# Patient Record
Sex: FEMALE
[De-identification: names, ages, dates, MRNs, and addresses within clinical notes are randomized per-mention and may not be internally consistent; named-entity substitution may affect disease eponyms.]

---

## 2023-05-17 ENCOUNTER — HOSPITAL ENCOUNTER (EMERGENCY)
Dept: HOSPITAL 97 - ER | Age: 28
LOS: 1 days | Discharge: HOME | End: 2023-05-18
Payer: COMMERCIAL

## 2023-05-17 DIAGNOSIS — R06.02: ICD-10-CM

## 2023-05-17 DIAGNOSIS — R07.9: Primary | ICD-10-CM

## 2023-05-17 PROCEDURE — 85379 FIBRIN DEGRADATION QUANT: CPT

## 2023-05-17 PROCEDURE — 99285 EMERGENCY DEPT VISIT HI MDM: CPT

## 2023-05-17 PROCEDURE — 81025 URINE PREGNANCY TEST: CPT

## 2023-05-17 PROCEDURE — 83735 ASSAY OF MAGNESIUM: CPT

## 2023-05-17 PROCEDURE — 93005 ELECTROCARDIOGRAM TRACING: CPT

## 2023-05-17 PROCEDURE — 85610 PROTHROMBIN TIME: CPT

## 2023-05-17 PROCEDURE — 96374 THER/PROPH/DIAG INJ IV PUSH: CPT

## 2023-05-17 PROCEDURE — 93970 EXTREMITY STUDY: CPT

## 2023-05-17 PROCEDURE — 81001 URINALYSIS AUTO W/SCOPE: CPT

## 2023-05-17 PROCEDURE — 80076 HEPATIC FUNCTION PANEL: CPT

## 2023-05-17 PROCEDURE — 71275 CT ANGIOGRAPHY CHEST: CPT

## 2023-05-17 PROCEDURE — 85025 COMPLETE CBC W/AUTO DIFF WBC: CPT

## 2023-05-17 PROCEDURE — 83880 ASSAY OF NATRIURETIC PEPTIDE: CPT

## 2023-05-17 PROCEDURE — 96375 TX/PRO/DX INJ NEW DRUG ADDON: CPT

## 2023-05-17 PROCEDURE — 80048 BASIC METABOLIC PNL TOTAL CA: CPT

## 2023-05-17 PROCEDURE — 84484 ASSAY OF TROPONIN QUANT: CPT

## 2023-05-17 PROCEDURE — 36415 COLL VENOUS BLD VENIPUNCTURE: CPT

## 2023-05-17 PROCEDURE — 71045 X-RAY EXAM CHEST 1 VIEW: CPT

## 2023-05-17 NOTE — XMS REPORT
Continuity of Care Document

                             Created on:May 17, 2023



Patient:LAURA JUAREZ

Sex:Female

:1995

External Reference #:[id number]





Demographics







                          Address                   [address]



                                                    [place] [address]

 

                          Home Phone                [phone]

 

                          Work Phone                [phone]

 

                          Mobile Phone              [phone]

 

                          Email Address             [email or web address]

 

                          Preferred Language        English

 

                          Marital Status            Unknown

 

                          Buddhism Affiliation     Unknown

 

                          Race                      Unknown

 

                          Additional Race(s)        Unavailable



                                                    Unavailable



                                                    White

 

                          Ethnic Group              Unknown









Author







                          Organization              [facility]

t

 

                          Address                   [address] [place] [address] [address] [place] [address]

 

                          Phone                     [phone]









Support







                Name            Relationship    Address         Phone

 

                Reggie Zaldivar       Spouse          [address] [phone]



                                                [place] [address] 









Care Team Providers







                    Name                Role                Phone

 

                    No, Pcp [facility]     Primary Care Physician Unavailable

 

                    EMILY VALLADARES     Attending Clinician Unavailable

 

                    Walter Yen DO Attending Clinician [phone]

 

                    YIN PINEDA       Attending Clinician Unavailable

 

                    Yin Pineda MD    Attending Clinician [phone]

 

                    Doctor Unassigned, No Name Attending Clinician Unavailable

 

                    Emily Valladares PA-C Attending Clinician [phone]

 

                    Edson Galdamez MD Attending Clinician [phone]

 

                    1, [facility]          Attending Clinician Unavailable

 

                    Lab, [facility]  Attending Clinician Unavailable

 

                    Hannah Taylor Attending Clinician [phone]

 

                    HANNAH GRIFFITHS    Attending Clinician Unavailable

 

                    VIRGINIA REYNOLDS    Attending Clinician Unavailable

 

                    EDSON GALDAMEZ Attending Clinician Unavailable

 

                    EDSON GALDAMEZ Attending Clinician Unavailable

 

                    JESS HAY     Attending Clinician Unavailable

 

                    EDSON GALDAMEZ Admitting Clinician Unavailable

 

                    Yin Pineda MD    Admitting Clinician [phone]

 

                    Edson Galdamez MD Admitting Clinician [phone]

 

                    YIN PINEDA       Admitting Clinician Unavailable

 

                    CASS HOYT Admitting Clinician Unavailable









Payers







           Payer Name Policy Type Policy Number Effective Date Expiration Date [employer]            [id number]  2020            



           HEALTH                           00:00:00              







Problems







       Condition Condition Condition Status Onset  Resolution Last   Treating Co

mments 

Source



       Name   Details Category        Date   Date   Treatment Clinician        



                                                 Date                 

 

       Liveborn Liveborn Disease Active                              Unive

rs



       infant, of infant, of               8-15                               it

y of



       jacobo jacobo               00:00:                             [place]

[place]



       pregnancy, pregnancy,               00                                 Me

dical



       born in born in                                                  [facility] hospital                                                  



       by vaginal by vaginal                                                  



       delivery delivery                                                  

 

       Encounter Encounter Disease Active                              Uni

vers



       for    for                  8-14                               ity of



       elective elective               00:00:                             [place]



       induction induction               00                                 Medi

neal



       of labor of labor                                                  [place]

 

       Elevated Elevated Disease Active                              Unive

rs



       blood  blood                8-12                               ity of



       pressure pressure               00:00:                             [place]



       reading reading               00                                 Medical



       without without                                                  [facility]



       diagnosis diagnosis                                                  



       of     of                                                      



       hypertensi hypertensi                                                  



       on     on                                                      

 

       Gestationa Gestationa Disease Active                              U

nivers



       l      l                    8-12                               ity of



       hypertensi hypertensi               00:00:                             [place]

[place]



       on, third on, third               00                                 Medi

neal



       trimester trimester                                                  [facility]

ch

 

       Obesity Obesity Disease Active                              [facility]



       (BMI   (BMI                 6-24                               ity of



       30-39.9) 30-39.9)               00:00:                             [place]



                                   00                                 [facility]

 

       Supervisio Supervisio Disease Active                              U

nivers



       n of other n of other               6-24                               it

y of



       normal normal               00:00:                             [place]



       pregnancy pregnancy               00                                 [facility]

 

       Pregnancy Pregnancy Disease Active                              [facility]



                                   2-16                               [facility]



                                   00:00:                             [address]

 

       Liveborn Liveborn Disease Active                       Overview: Un

sourav



       infant by infant by               3-18                        IOL;   ity 

of



       vaginal vaginal               00:00:                      39w4d; [place]



       delivery delivery               00                          ;  [facility]

l



                                                               intact [place]

 

       Anemia, Anemia, Disease Active                              [facility]



       postpartum postpartum               3-18                               it

y of



                                   00:00:                             [address]

 

       39 weeks 39 weeks Disease Active                              Unive

rs



       gestation gestation               3-17                               ity 

of



       of     of                   00:00:                             [place]



       pregnancy pregnancy               00                                 [facility]

 

       Multiparit Multiparit Disease Active                              U

nivers



       y      y                    8-                               ity of



                                   00:00:                             [address]

 

       ASCUS with ASCUS with Disease Active                       Overview

: [facility]



       positive positive                                       Repeat 1 ity 

of



       high risk high risk               00:00:                      year   [place]

s



       HPV    HPV                                                   [facility]

 

       Obesity Obesity Disease Active                       Overview: Univ

ers



       complicati complicati                                       ICD10  it

y of



       ng     ng                   00:00:                      Diagnosis [place]



       pregnancy, pregnancy,               00                          Term   Me

dical



       childbirth childbirth                                           [facility] 

Br[facility]



       , or   , or                                             Utility 



       puerperium puerperium                                                  



       ,      ,                                                       



       antepartum antepartum                                                  

 

       Supervisio Supervisio Disease Active                       Overview

: Univers



       n of other n of other               8-27                        ICD10  it

y of



       high-risk high-risk               00:00:                      Diagnosis T

[place]



       pregnancy pregnancy               00                          Term   Medi

neal



                                                               [facility]



                                                               Utility 

 

       Screening Screening Disease Active                              Uni

vers



       for STD for STD               2-18                               ity of



       (sexually (sexually               00:00:                             [place]

s



       transmitte transmitte               00                                 Me

dical



       d disease) d disease)                                                  Br

anch







Allergies, Adverse Reactions, Alerts







       Allergy Allergy Status Severity Reaction(s) Onset  Inactive Treating Comm

ents 

Source



       Name   Type                        Date   Date   Clinician        

 

       NO KNOWN Drug   Active                                           [facility]



       ALLERGIE Class                                                   ity of



       S                                                              [facility]

 

       NO KNOWN Allergy Active                                           [facility]







Social History







           Social Habit Start Date Stop Date  Quantity   Comments   Source

 

           ASSERTION  2019            Pregnant              [facility]



                      00:00:00                                    [facility]

 

           Exposure to                       Not sure              [facility]



           SARS-CoV-2                                             [place] [facility]



           (event)                                                Branch

 

           Alcohol intake 2021 Current               [facility]

es



                      00:00:00   00:00:00   non-drinker of            Medical Ce

nter



                                            alcohol               



                                            (finding)             

 

           Tobacco use and 2020-10-01 2020-10-01 Never used            [facility]

y of



           exposure   00:00:00   00:00:00                         [place] [facility]



                                                                  Branch

 

           History [facility] Food 2020 1                     Univers

ity of



           Worry      00:00:00   00:00:00                         [place] [facility]



                                                                  Branch

 

           History [facility] Food 2020 1                     Univers

ity of



           Scarcity   00:00:00   00:00:00                         [place] [facility]



                                                                  Branch

 

           History SDOH 2020 2                     University o

f



           Transport Med 00:00:00   00:00:00                         [place] [facility]

al



                                                                  Branch

 

           History [facility] 2020 2                     University o

f



           Transport Non-Med 00:00:00   00:00:00                         [place] M

edical



                                                                  Branch

 

           Sex Assigned At 1995                       [facility]

[facility]



           [facility]      00[facility]00[facility]00   [facility]00                         [facility]









                Smoking Status  Start Date      Stop Date       Source

 

                Never smoker                                    [facility]







Medications







       Ordered Filled Start  Stop   Current Ordering Indication Dosage Frequency

 Signature

                    Comments            Components          Source



     Medication Medication Date Date Medication? Clinician                (SIG) 

          



     Name Name                                                   

 

     levonorgest      2020 2020- No             1{devic                     Un

sourav



     reL       0- 10-                e}                       ity of



     (MIRENA)      18:00: 16:47                                         [place]



     IUD 1      00   :00                                          Medical



     Device                                                        Branch

 

     levonorgest      2020 2020- No             1{devic      1 Device,        

   Univers



     reL       0- 10-                e}        Intrauteri           ity of



     (MIRENA)      18:00: 16:47                          ne, ONCE,           [facility]

as



     IUD 1      00   :00                           1 dose,           Medical



     Device                                         u            Branch



                                                  10/1/20 at           



                                                  1300,           



                                                  Routine           

 

     levonorgest      2020 2020- No             1{devic                     Un

sourav



     reL       0- 10-                e}                       ity of



     (MIRENA)      18:00: 16:47                                         [place]



     IUD 1      00   :00                                          Medical



     Device                                                        Branch

 

     levonorgest      2020 2020- No             1{devic      1 Device,        

   Univers



     reL       0- 10-                e}        Intrauteri           ity of



     (MIRENA)      18:00: 16:47                          ne, ONCE,           [facility]

as



     IUD 1      00   :00                           1 dose,           Medical



     Device                                         u            Branch



                                                  10/1/20 at           



                                                  1300,           



                                                  Routine           

 

     prenatal      -0      Yes       [id number] 1{tbl}      Take 1           U

nivers



     vitamin      8-20                               tablet by           ity of



     w/FA tablet      00:00:                               mouth           [place]



               00                                 daily.           Medical



                                                                 Branch

 

     prenatal      -0      Yes       [id number] 1{tbl}      Take 1           U

nivers



     vitamin      8-20                               tablet by           ity of



     w/FA tablet      00:00:                               mouth           [place]



               00                                 daily.           Medical



                                                                 Branch

 

     prenatal      2020-0      Yes       [id number] 1{tbl}      Take 1           U

nivers



     vitamin      8-20                               tablet by           ity of



     w/FA tablet      00:00:                               mouth           [place]



               00                                 daily.           Medical



                                                                 Branch

 

     prenatal      2020-0      Yes       [id number] 1{tbl}      Take 1           U

nivers



     vitamin      8-20                               tablet by           ity of



     w/FA tablet      00:00:                               mouth           [place]



               00                                 daily.           Medical



                                                                 Branch

 

     prenatal      2020-0      Yes       [id number] 1{tbl}      Take 1           U

nivers



     vitamin      8-20                               tablet by           ity of



     w/FA tablet      00:00:                               mouth           [place]



               00                                 daily.           Medical



                                                                 [place]

 

     prenatal      2020-0      Yes       [id number] 1{tbl}      Take 1           U

nivers



     vitamin      8-20                               tablet by           ity of



     w/FA tablet      00:00:                               mouth           [place]



               00                                 daily.           Medical



                                                                 Branch

 

     prenatal      2020-0      Yes       [id number] 1{tbl}      Take 1           U

nivers



     vitamin      8-20                               tablet by           ity of



     w/FA tablet      00:00:                               mouth           [place]



               00                                 daily.           Medical



                                                                 Branch

 

     prenatal      2020-0      Yes       [id number] 1{tbl}      Take 1           U

nivers



     vitamin      8-15                               tablet by           ity of



     w/FA tablet      00:00:                               mouth           [place]



               00                                 daily.           Medical



                                                                 Branch

 

     docusate      2020-0      Yes       [id number] 240mg      Take 1           Un

sourav



     calcium 240      8-15                               capsule by           it

y of



     mg capsule      00:00:                               mouth once           T

[place]



               00                                 daily as           Medical



                                                  needed for           Branch



                                                  Constipati           



                                                  on.            

 

     ferrous      2020-0      Yes       [id number] 325mg      Take 1           Uni

vers



     sulfate 325      8-15                               tablet by           ity

 of



     mg (65 mg      00:00:                               mouth 2           [place]



     iron)      00                                 (two)           Medical



     tablet                                         times           Branch



                                                  daily.           

 

     ibuprofen      -0      Yes       [id number] 600mg      Take 1           U

nivers



     600 mg      8-15                               tablet by           ity of



     tablet      00:00:                               mouth           [place]



               00                                 every 6           Medical



                                                  (six)           Branch



                                                  hours as           



                                                  needed           



                                                  (Pain).           



                                                  Take with           



                                                  food or           



                                                  milk.           

 

     docusate      -0      Yes       [id number] 240mg      Take 1           Un

sourav



     calcium 240      8-15                               capsule by           it

y of



     mg capsule      00:00:                               mouth once           T

exas



               00                                 daily as           Medical



                                                  needed for           Branch



                                                  Constipati           



                                                  on.            

 

     ferrous      -0      Yes       [id number] 325mg      Take 1           Uni

vers



     sulfate 325      8-15                               tablet by           ity

 of



     mg (65 mg      00:00:                               mouth 2           [place]



     iron)      00                                 (two)           Medical



     tablet                                         times           Branch



                                                  daily.           

 

     ibuprofen      -0      Yes       [id number] 600mg      Take 1           U

nivers



     600 mg      8-15                               tablet by           ity of



     tablet      00:00:                               mouth           [place]



               00                                 every 6           Medical



                                                  (six)           Branch



                                                  hours as           



                                                  needed           



                                                  (Pain).           



                                                  Take with           



                                                  food or           



                                                  milk.           

 

     docusate      -0 2020- No        [id number] 240mg      Take 1           U

nivers



     calcium 240      8-15 09-15                          capsule by           i

ty of



     mg capsule      00:00: 00:00                          mouth once           

[place]



               00   :00                           daily as           Medical



                                                  needed for           Branch



                                                  Constipati           



                                                  on.            

 

     ferrous      -0 2020- No        [id number] 325mg      Take 1           Un

sourav



     sulfate 325      8-15 09-15                          tablet by           it

y of



     mg (65 mg      00:00: 00:00                          mouth 2           [place]

s



     iron)      00   :00                           (two)           Medical



     tablet                                         times           Branch



                                                  daily.           

 

     ibuprofen      -0 2020- No        [id number] 600mg      Take 1           

Univers



     600 mg      8-15 09-15                          tablet by           ity of



     tablet      00:00: 00:00                          mouth           [place]



               00   :00                           every 6           [facility]



                                                  (six)           [facility]



                                                  hours as           



                                                  needed           



                                                  (Pain).           



                                                  Take with           



                                                  food or           



                                                  milk.           

 

     prenatal      -0 2020- No        [id number] 1{tbl}      Take 1           

[facility]



     vitamin      815 08-20                          tablet by           [facility]



     w/FA tablet      00:00: 00:00                          mouth           [place]

[facility]



               00   :00                           daily.           [facility]



                                                                 [facility]

 

     rho(D)      -0      Yes            300ug      300 mcg,           [facility]

s



     immune      8-14                               Intramuscu           ity of



     globulin      21:54:                               lar, ONCE,           [facility]

as



     (RHOGAM)      06                                 For 1           Medical



     syringe 300                                         dose,           [facility]



     mcg                                          Conditiona           



                                                  l, Routine           

 

     HYDROcodone      -0      Yes            1{tbl}      1 tablet,          

 [facility]



     -acetaminop      814                               Oral,           [facility] of



     hen (NORCO      21:53:                               Q6HPRN,           [place]

[facility]



     5) 5-325 mg      49                                 Starting           Medi

neal



     tablet 1                                         Fri            [facility]anch



     tablet                                         20 at           



                                                  1653,           



                                                  Until           



                                                  Discontinu           



                                                  ed,            



                                                  Routine,           



                                                  Pain           



                                                  (scale           



                                                  7-10)           

 

     ibuprofen      -0      Yes            600mg      600 mg,           [facility]

ers



     (IBU)      8-14                               Oral,           [facility]



     tablet 600      21:53:                               Q6HPRN,           [place]

[facility]



     mg        49                                 Starting           Medical



                                                  Fri            [facility]



                                                  20 at           



                                                  1653,           



                                                  Until           



                                                  Discontinu           



                                                  ed,            



                                                  Routine,           



                                                  Pain           



                                                  (scale           



                                                  4-6)           

 

     acetaminoph      -0      Yes            650mg      650 mg,           Un

[facility]



     en        8-14                               Oral,           ity of



     (TYLENOL)      21:53:                               Q6HPRN,           [place]



     tablet 650      49                                 Starting           Medic

al



     mg                                           Fri            [facility]



                                                  20 at           



                                                  1653,           



                                                  Until           



                                                  Discontinu           



                                                  ed,            



                                                  Routine,           



                                                  Pain           



                                                  (scale           



                                                  1-3)           

 

     diphenhydrA      2020-0      Yes            25mg      25 mg,           Un[facility]

ers



     MINE      8-14                               Oral,           [facility] of



     (BENADRYL)      21:53:                               Q6HPRN,           [place]

[facility]



     tablet 25      49                                 Starting           [facility]

l



     mg                                           Fri            [facility]



                                                  20 at           



                                                  1653,           



                                                  Until           



                                                  Discontinu           



                                                  ed,            



                                                  Routine,           



                                                  Sleep,           



                                                  Itching           

 

     ondansetron      2020-0      Yes            4mg       4 mg, Slow           

[facility]



     (ZOFRAN                                     IV Push,           [facility]



     (PF))      21:53:                               Q8HPRN,           [place]



     injection 4      49                                 Starting           Medi

neal



     mg                                           Fri            [facility]



                                                  20 at           



                                                  1653,           



                                                  Until           



                                                  Discontinu           



                                                  ed,            



                                                  Routine,           



                                                  Nausea and           



                                                  Vomiting           



                                                  (N/V)           

 

     simethicone      2020-0      Yes            160mg      160 mg,           Un

s[facility]



     (GAS RELIEF                                     Oral,           ity of



     (SIMETHICON      21:53:                               PC+HSPRN,           [facility]

[facility]



     E))       49                                 Starting           Medical



     chewable                                         Fri            [facility]



     tablet 160                                         20 at           



     mg                                           1653,           



                                                  Until           



                                                  Discontinu           



                                                  ed,            



                                                  Routine,           



                                                  Gas            

 

     docusate      2020-0      Yes            240mg      240 mg,           [facility]

rs



     calcium      8-                               Oral,           ity of



     (SURFAK)      21:53:                               QDAILYPRN,           [facility]

as



     capsule 240      49                                 Starting           Medi

neal



     mg                                           Fri            [facility]



                                                  20 at           



                                                  1653,           



                                                  Until           



                                                  Discontinu           



                                                  ed,            



                                                  Routine,           



                                                  Constipati           



                                                  on             

 

     magnesium      2020-0      Yes            30mL      30 mL,           [facility]

s



     hydroxide                                     Oral,           ity of



     (MILK OF      21:53:                               QDAILYPRN,           [facility]

as



     MAGNESIA)      49                                 Starting           [facility]

l



     400 mg/5 mL                                         Fri            [facility]



     suspension                                         20 at           



     30 mL                                         3,           



                                                  Until           



                                                  Discontinu           



                                                  ed,            



                                                  Routine,           



                                                  Constipati           



                                                  on             

 

     benzocaine-      2020-0      Yes                      Topical,           Un

[facility]



     menthol                                     PRN,           [facility]



     (DERMOPLAST      21:53:                               Starting           [facility]

[place]



     ) 20-0.5 %      49                                 Fri            Medical



     topical                                         20 at           [facility]



     spray                                         1653,           



                                                  Until           



                                                  Discontinu           



                                                  ed,            



                                                  Routine,           



                                                  Perineum           



                                                  discomfort           

 

     LR 1000 mL      0 2020- No                       at 125           [facility]

ers



     + oxytocin      14                          mL/hr, IV           ity

 of



     20 units IV      16:15: 17:39                          Infusion,           

[place]



     Solution      00   :00                           ONCE, 1           Medical



                                                  dose, Fri           [facility]



                                                  20 at           



                                                  1115,           



                                                  Routine           

 

     FENTanyl PF      -0 2020- No             100ug      100 mcg,           

[facility]



     (SUBLIMAZE                                Slow IV           ity o

f



     (PF))      12:31: 21:54                          Push,           [place]



     injection      01   :07                           Q1HPRN,           Medical



     100 mcg                                         Starting           [facility]



                                                  Fri            



                                                  20 at           



                                                  0731,           



                                                  Until Fri           



                                                  20 at           



                                                  1654,           



                                                  Routine,           



                                                  contractio           



                                                  n pain           



                                                  without an           



                                                  epidural           



                                                  and SVE <           



                                                  8 cm and           



                                                  Cat I           



                                                  strip           

 

     LR 1000 mL      2020-0 2020- No             2mU/min      at 6-120          

 [facility]



     + oxytocin       08-14                          mL/hr, IV           ity

 of



     20 units IV      10:28: 21:54                          Infusion,           

[place]



     Solution      38   :07                           TITRATE,           Medical



                                                  Starting           [facility]



                                                  20 at           



                                                  0528,           



                                                  Until 20 at           



                                                  1654, ASAP           

 

     D5W-LR IV      2020-0 2020- No             1000mL      at 125           Uni

vers



     infusion       08-14                          mL/hr, IV           ity o

f



     1,000 mL      09:30: 21:54                          Infusion,           [facility]

as



               00   :07                           CONTINUOUS           Medical



                                                  , Starting           [facility]



                                                  20 at           



                                                  0430,           



                                                  Until 20 at           



                                                  1654,           



                                                  Routine           

 

     famotidine      2020-0      Yes       [id number] 20mg      Take 1           

[facility]



     20 mg      7-13                               tablet by           [facility]



     tablet      00:00:                               mouth 2           [place]



               00                                 (two)           Medical



                                                  times           [facility]



                                                  daily.           

 

     hydrOXYzine      2020-0      Yes       [id number] 25mg      Take 1           

[facility]



     25 mg      7-13                               tablet by           [facility]



     tablet      00:00:                               mouth           [place]



               00                                 every 6           Medical



                                                  (six)           [facility]



                                                  hours as           



                                                  needed for           



                                                  Itching.           

 

     famotidine      2020-0      Yes       [id number] 20mg      Take 1           

[facility]



     20 mg      7-13                               tablet by           [facility] of



     tablet      00:00:                               mouth 2           [place]



               00                                 (two)           Medical



                                                  times           [facility]



                                                  daily.           

 

     hydrOXYzine      2020-0      Yes       [id number] 25mg      Take 1           

[facility]



     25 mg      7-13                               tablet by           [facility] of



     tablet      00:00:                               mouth           [place]



               00                                 every 6           Medical



                                                  (six)           [facility]



                                                  hours as           



                                                  needed for           



                                                  Itching.           

 

     famotidine      2020-0      Yes       [id number] 20mg      Take 1           

[facility]



     20 mg      7-13                               tablet by           [facility] of



     tablet      00:00:                               mouth 2           [place]



                                                (two)           Medical



                                                  times           [facility]



                                                  daily.           

 

     hydrOXYzine      2020-0      Yes       [id number] 25mg      Take 1           

[facility]



     25 mg      7-13                               tablet by           [facility]



     tablet      00:00:                               mouth           [place]



               00                                 every 6           Medical



                                                  (six)           [facility]



                                                  hours as           



                                                  needed for           



                                                  Itching.           

 

     famotidine      2020-0      Yes       [id number] 20mg      Take 1           

[facility]



     20 mg      7-13                               tablet by           [facility] of



     tablet      00:00:                               mouth 2           [place]



               00                                 (two)           Medical



                                                  times           [facility]



                                                  daily.           

 

     hydrOXYzine      2020-0      Yes       [id number] 25mg      Take 1           

[facility]



     25 mg      7-13                               tablet by           ity of



     tablet      00:00:                               mouth           [place]



               00                                 every 6           [facility]



                                                  (six)           [facility]



                                                  hours as           



                                                  needed for           



                                                  Itching.           

 

     famotidine      2020-0      Yes       [id number] 20mg      Take 1           

[facility]



     20 mg      7-13                               tablet by           ity of



     tablet      00:00:                               mouth 2           [place]



               00                                 (two)           Medical



                                                  times           [facility]



                                                  daily.           

 

     hydrOXYzine      2020-0      Yes       [id number] 25mg      Take 1           

[facility]



     25 mg      7-13                               tablet by           ity of



     tablet      00:00:                               mouth           [place]



               00                                 every 6           Medical



                                                  (six)           [facility]



                                                  hours as           



                                                  needed for           



                                                  Itching.           

 

     famotidine      2020-0      Yes       [id number] 20mg      Take 1           

[facility]



     20 mg      7-13                               tablet by           ity of



     tablet      00:00:                               mouth 2           [place]



               00                                 (two)           Medical



                                                  times           [facility]



                                                  daily.           

 

     hydrOXYzine      2020-0      Yes       [id number] 25mg      Take 1           

[facility]



     25 mg      7-13                               tablet by           ity of



     tablet      00:00:                               mouth           [place]



               00                                 every 6           [facility]



                                                  (six)           [facility]



                                                  hours as           



                                                  needed for           



                                                  Itching.           

 

     famotidine      2020-0      Yes       [id number] 20mg      Take 1           

[facility]



     20 mg      7-13                               tablet by           ity of



     tablet      00:00:                               mouth 2           [place]



               00                                 (two)           Medical



                                                  times           [facility]



                                                  daily.           

 

     hydrOXYzine      2020-0      Yes       [id number] 25mg      Take 1           

[facility]



     25 mg      7-13                               tablet by           ity of



     tablet      00:00:                               mouth           [place]



               00                                 every 6           [facility]



                                                  (six)           [facility]



                                                  hours as           



                                                  needed for           



                                                  Itching.           

 

     famotidine      2020-0 2020- No        [id number] 20mg      Take 1          

 [facility]



     20 mg      7-13 08-15                          tablet by           ity of



     tablet      00:00: 00:00                          mouth 2           [place]



               00   :00                           (two)           Medical



                                                  times           [facility]



                                                  daily.           

 

     hydrOXYzine      2020-0 2020- No        [id number] 25mg      Take 1          

 [facility]



     25 mg      7-13 08-15                          tablet by           ity of



     tablet      00:00: 00:00                          mouth           [place]



               00   :00                           every 6           [facility]



                                                  (six)           [facility]



                                                  hours as           



                                                  needed for           



                                                  Itching.           

 

     PRENATAL      2017-0      Yes            1{tbl} QD   Take 1           [facility]

t



     VIT#96/FERR      2-18                               tablet by           [facility] FUM/FA      11:31:                               mouth           [facility]

l



     (PRENATAL      52                                 daily.           [facility]



     VITAMIN                                                        



     W/IRON-BRET                                                        



     TE) 27 mg                                                        



     iron- 800                                                        



     mcg Tab                                                        

 

     docusate      -0      Yes            240mg      Take 1 Cap           Un

[facility]rav



     calcium      3-19                               by mouth           [facility]



     (SURFAK)      00:00:                               once daily           [facility]

as



     240 mg      00                                 as needed           Medical



     capsule                                         for            [facility]



                                                  Constipati           



                                                  on.            

 

     ferrous      2016-0      Yes            325mg      Take 1 Tab           [facility]

vers



     sulfate 325      3-19                               by mouth 3           it

y of



     mg (65 mg      00:00:                               (three)           [place]



     iron)      00                                 times           [facility]



     tablet                                         daily with           [facility]



                                                  meals.           

 

     docusate      2016-0      Yes            240mg      Take 1 Cap           [facility]

[facility]



     calcium      3-19                               by mouth           [facility]



     (SURFAK)      00:00:                               once daily           [facility]

[facility]



     240 mg      00                                 as needed           [facility]



     capsule                                         for            [facility]



                                                  Constipati           



                                                  on.            

 

     ferrous      2016-0      Yes            325mg      Take 1 Tab           [facility]

[facility]



     sulfate 325      3-19                               by mouth 3           it

y of



     mg (65 mg      00:00:                               (three)           [place]



     iron)      00                                 times           [facility]



     tablet                                         daily with           [facility]



                                                  meals.           

 

     docusate      2016-0      Yes            240mg      Take 1 Cap           [facility]

[facility]



     calcium      3-19                               by mouth           [facility]



     (SURFAK)      00:00:                               once daily           [facility]

[facility]



     240 mg      00                                 as needed           [facility]



     capsule                                         for            [facility]



                                                  Constipati           



                                                  on.            

 

     ferrous      2016-0      Yes            325mg      Take 1 Tab           [facility]

[facility]



     sulfate 325      3-19                               by mouth 3           it

y of



     mg (65 mg      00:00:                               (three)           [place]



     iron)      00                                 times           Medical



     tablet                                         daily with           [facility]



                                                  meals.           

 

     docusate      2016-0      Yes            240mg      Take 1 Cap           Un

[facility]



     calcium      3-19                               by mouth           [facility]



     (SURFAK)      00:00:                               once daily           [facility]

as



     240 mg      00                                 as needed           [facility]



     capsule                                         for            [facility]



                                                  Constipati           



                                                  on.            

 

     ferrous      2016-0      Yes            325mg      Take 1 Tab           [facility]

[facility]



     sulfate 325      3-19                               by mouth 3           it

y of



     mg (65 mg      00:00:                               (three)           [place]



     iron)      00                                 times           Medical



     tablet                                         daily with           [facility]



                                                  meals.           

 

     docusate      2016-0      Yes            240mg      Take 1 Cap           Un

[facility]



     calcium      3-19                               by mouth           [facility]



     (SURFAK)      00:00:                               once daily           [facility]

as



     240 mg      00                                 as needed           [facility]



     capsule                                         for            [facility]



                                                  Constipati           



                                                  on.            

 

     docusate      2016-0      Yes            240mg      Take 1 Cap           Un

[facility]



     calcium      3-19                               by mouth           [facility] of



     (SURFAK)      00:00:                               once daily           [facility]

as



     240 mg      00                                 as needed           [facility]



     capsule                                         for            [facility]



                                                  Constipati           



                                                  on.            

 

     ferrous      2016-0      Yes            325mg      Take 1 Tab           [facility]

[facility]



     sulfate 325      3-19                               by mouth 3           it

y of



     mg (65 mg      00:00:                               (three)           [place]



     iron)      00                                 times           [facility]



     tablet                                         daily with           [facility]



                                                  meals.           

 

     ferrous      2016-0      Yes            325mg      Take 1 Tab           [facility]

vers



     sulfate 325      3-19                               by mouth 3           it

y of



     mg (65 mg      00:00:                               (three)           [place]



     iron)      00                                 times           [facility]



     tablet                                         daily with           [facility]



                                                  meals.           

 

     docusate      2016-0      Yes            240mg      Take 1 Cap           Un

[facility]



     calcium      3-19                               by mouth           [facility]



     (SURFAK)      00:00:                               once daily           [facility]

as



     240 mg      00                                 as needed           [facility]



     capsule                                         for            [facility]



                                                  Constipati           



                                                  on.            

 

     ibuprofen      2016-0      Yes            600mg      Take 1 Tab           U

nivers



     (MOTRIN)      3-19                               by mouth           ity of



     600 mg      00:00:                               every 6           [place]



     tablet      00                                 (six)           [facility]



                                                  hours as           [facility]



                                                  needed for           



                                                  Pain           



                                                  (scale           



                                                  4-6). Take           



                                                  with food           



                                                  or milk.           

 

     ferrous      2016-0      Yes            325mg      Take 1 Tab           [facility]

[facility]



     sulfate 325      3-19                               by mouth 3           it

y of



     mg (65 mg      00:00:                               (three)           [place]



     iron)      00                                 times           [facility]



     tablet                                         daily with           [facility]



                                                  meals.           

 

     docusate      2016-0      Yes            240mg      Take 1 Cap           Un

[facility]



     calcium      3-19                               by mouth           [facility] of



     (SURFAK)      00:00:                               once daily           [facility]

[facility]



     240 mg      00                                 as needed           [facility]



     capsule                                         for            [facility]



                                                  Constipati           



                                                  on.            

 

     ferrous      2016-0      Yes            325mg      Take 1 Tab           [facility]

vers



     sulfate 325      3-19                               by mouth 3           it

y of



     mg (65 mg      00:00:                               (three)           [place]



     iron)      00                                 times           Medical



     tablet                                         daily with           [facility]



                                                  meals.           

 

     docusate      2016-0      Yes            240mg      Take 1 Cap           [facility]

[facility]



     calcium      3-19                               by mouth           [facility]



     (SURFAK)      00:00:                               once daily           [facility]

[facility]



     240 mg      00                                 as needed           [facility]



     capsule                                         for            [facility]



                                                  Constipati           



                                                  on.            

 

     ferrous      2016-0      Yes            325mg      Take 1 Tab           [facility]

[facility]



     sulfate 325      3-19                               by mouth 3           it

y of



     mg (65 mg      00:00:                               (three)           [place]



     iron)      00                                 times           Medical



     tablet                                         daily with           [facility]



                                                  meals.           

 

     docusate      2016-0      Yes            240mg      Take 1 Cap           [facility]

[facility]



     calcium      3-19                               by mouth           [facility]



     (SURFAK)      00:00:                               once daily           [facility]

as



     240 mg      00                                 as needed           [facility]



     capsule                                         for            [facility]



                                                  Constipati           



                                                  on.            

 

     ferrous      2016-0      Yes            325mg      Take 1 Tab           [facility]

[facility]



     sulfate 325      3-19                               by mouth 3           it

y of



     mg (65 mg      00:00:                               (three)           [place]



     iron)      00                                 times           [facility]



     tablet                                         daily with           [facility]



                                                  meals.           

 

     docusate      2016-0      Yes            240mg      Take 1 Cap           [facility]

[facility]



     calcium      3-19                               by mouth           [facility]



     (SURFAK)      00:00:                               once daily           [facility]

as



     240 mg      00                                 as needed           [facility]



     capsule                                         for            [facility]



                                                  Constipati           



                                                  on.            

 

     ferrous      2016-0      Yes            325mg      Take 1 Tab           [facility]

[facility]



     sulfate 325      3-19                               by mouth 3           it

y of



     mg (65 mg      00:00:                               (three)           [place]



     iron)      00                                 times           [facility]



     tablet                                         daily with           [facility]



                                                  meals.           

 

     docusate      2016-0      Yes            240mg      Take 1 Cap           Un

[facility]



     calcium      3-19                               by mouth           [facility] of



     (SURFAK)      00:00:                               once daily           [facility]

as



     240 mg      00                                 as needed           [facility]



     capsule                                         for            [facility]



                                                  Constipati           



                                                  on.            

 

     ferrous      2016-0      Yes            325mg      Take 1 Tab           [facility]

vers



     sulfate 325      3-19                               by mouth 3           it

y of



     mg (65 mg      00:00:                               (three)           [place]



     iron)      00                                 times           [facility]



     tablet                                         daily with           [facility]



                                                  meals.           

 

     docusate      2016-0      Yes            240mg      Take 1 Cap           [facility]

[facility]



     calcium      3-19                               by mouth           [facility]



     (SURFAK)      00:00:                               once daily           [facility]

as



     240 mg      00                                 as needed           [facility]



     capsule                                         for            [facility]



                                                  Constipati           



                                                  on.            

 

     ferrous      2016-0      Yes            325mg      Take 1 Tab           [facility]

[facility]



     sulfate 325      3-19                               by mouth 3           it

y of



     mg (65 mg      00:00:                               (three)           [place]



     iron)      00                                 times           [facility]



     tablet                                         daily with           [facility]



                                                  meals.           

 

     docusate      2016-0      Yes            240mg      Take 1 Cap           [facility]

[facility]



     calcium      3-19                               by mouth           [facility]



     (SURFAK)      00:00:                               once daily           [facility]

as



     240 mg      00                                 as needed           [facility]



     capsule                                         for            [facility]



                                                  Constipati           



                                                  on.            

 

     ferrous      2016-0      Yes            325mg      Take 1 Tab           [facility]

[facility]



     sulfate 325      3-19                               by mouth 3           it

y of



     mg (65 mg      00:00:                               (three)           [place]



     iron)      00                                 times           [facility]



     tablet                                         daily with           [facility]



                                                  meals.           

 

     docusate      2016-0      Yes            240mg      Take 1 Cap           [facility]

[facility]



     calcium      3-19                               by mouth           [facility] of



     (SURFAK)      00:00:                               once daily           [facility]

as



     240 mg      00                                 as needed           [facility]



     capsule                                         for            [facility]



                                                  Constipati           



                                                  on.            

 

     ferrous      2016-0      Yes            325mg      Take 1 Tab           [facility]

vers



     sulfate 325      3-19                               by mouth 3           it

y of



     mg (65 mg      00:00:                               (three)           [place]



     iron)      00                                 times           [facility]



     tablet                                         daily with           [facility]



                                                  meals.           

 

     docusate      2016-0      Yes            240mg      Take 1 Cap           [facility]

[facility]



     calcium      3-19                               by mouth           [facility] of



     (SURFAK)      00:00:                               once daily           [facility]

as



     240 mg      00                                 as needed           [facility]



     capsule                                         for            [facility]



                                                  Constipati           



                                                  on.            

 

     ferrous            Yes            325mg      Take 1 Tab           [facility]

vers



     sulfate 325      3-19                               by mouth 3           it

y of



     mg (65 mg      00:00:                               (three)           [place]



     iron)      00                                 times           [facility]



     tablet                                         daily with           [facility]



                                                  meals.           

 

     docusate       2020- No             240mg      Take 1 Cap           [facility]

n[facility]



     calcium      3-19 08-15                          by mouth           [facility] of



     (SURFAK)      00:00: 00:00                          once daily           [facility]



     240 mg      00   :00                           as needed           [facility]



     capsule                                         for            [facility]



                                                  Constipati           



                                                  on.            

 

     ferrous      2020- No             325mg      Take 1 Tab           [facility]

[facility]



     sulfate 325      3-19 08-15                          by mouth 3           i

ty of



     mg (65 mg      00:00: 00:00                          (three)           [place]

[facility]



     iron)      00   :00                           times           [facility]



     tablet                                         daily with           [facility]



                                                  meals.           

 

     ibuprofen      2020- No             600mg      Take 1 Tab           

[facility]



     (MOTRIN)      3-19 06-24                          by mouth           [facility]



     600 mg      00:00: 00:00                          every 6           [place]



     tablet      00   :00                           (six)           [facility]



                                                  hours as           [facility]



                                                  needed for           



                                                  Pain           



                                                  (scale           



                                                  4-6). Take           



                                                  with food           



                                                  or milk.           

 

     prenatal            Yes            1{tbl}      Take 1 Tab           [facility]

[facility]



     vitamin      7-10                               by mouth           [facility]



     w/FA      00:00:                               daily.           [place]



     (PRENATABS      00                                                Medical



     RX) tablet                                                        [facility]

 

     prenatal            Yes            1{tbl}      Take 1 Tab           [facility]

[facility]



     vitamin      7-10                               by mouth           ity of



     w/FA      00:00:                               daily.           [place]



     (PRENATABS      00                                                Medical



     RX) tablet                                                        [facility]

 

     prenatal            Yes            1{tbl}      Take 1 Tab           [facility]

[facility]



     vitamin      7-10                               by mouth           ity of



     w/FA      00:00:                               daily.           [place]



     (PRENATABS      00                                                Medical



     RX) tablet                                                        [facility]

 

     prenatal            Yes            1{tbl}      Take 1 Tab           [facility]

[facility]



     vitamin      7-10                               by mouth           [facility] of



     w/FA      00:00:                               daily.           [place]



     (PRENATABS      00                                                Medical



     RX) tablet                                                        [facility]

 

     prenatal            Yes            1{tbl}      Take 1 Tab           [facility]

[facility]



     vitamin      7-10                               by mouth           ity of



     w/FA      00:00:                               daily.           [place]



     (PRENATABS      00                                                Medical



     RX) tablet                                                        [facility]

 

     prenatal            Yes            1{tbl}      Take 1 Tab           [facility]

[facility]



     vitamin      7-10                               by mouth           ity of



     w/FA      00:00:                               daily.           [place]



     (PRENATABS      00                                                Medical



     RX) tablet                                                        [facility]

 

     prenatal            Yes            1{tbl}      Take 1 Tab           [facility]

[facility]



     vitamin      7-10                               by mouth           ity of



     w/FA      00:00:                               daily.           [place]



     (PRENATABS      00                                                Medical



     RX) tablet                                                        [facility]

 

     prenatal            Yes            1{tbl}      Take 1 Tab           [facility]

[facility]



     vitamin      7-10                               by mouth           ity of



     w/FA      00:00:                               daily.           [place]



     (PRENATABS      00                                                Medical



     RX) tablet                                                        [facility]

 

     prenatal            Yes            1{tbl}      Take 1 Tab           [facility]

[facility]



     vitamin      7-10                               by mouth           ity of



     w/FA      00:00:                               daily.           [place]



     (PRENATABS      00                                                Medical



     RX) tablet                                                        [facility]

 

     prenatal            Yes            1{tbl}      Take 1 Tab           [facility]

[facility]



     vitamin      7-10                               by mouth           [facility] of



     w/FA      00:00:                               daily.           [place]



     (PRENATABS      00                                                Medical



     RX) tablet                                                        [facility]

 

     prenatal            Yes            1{tbl}      Take 1 Tab           [facility]

[facility]



     vitamin      7-10                               by mouth           ity of



     w/FA      00:00:                               daily.           [place]



     (PRENATABS      00                                                Medical



     RX) tablet                                                        [facility]

 

     prenatal            Yes            1{tbl}      Take 1 Tab           [facility]

[facility]



     vitamin      7-10                               by mouth           ity of



     w/FA      00:00:                               daily.           [place]



     (PRENATABS      00                                                Medical



     RX) tablet                                                        [facility]

 

     prenatal            Yes            1{tbl}      Take 1 Tab           [facility]

[facility]



     vitamin      7-10                               by mouth           ity of



     w/FA      00:00:                               daily.           [place]



     (PRENATABS      00                                                Medical



     RX) tablet                                                        Branch

 

     prenatal            Yes            1{tbl}      Take 1 Tab           [facility]

[facility]



     vitamin      7-10                               by mouth           [facility]



     w/FA      00:00:                               daily.           [place]



     ([facility]      00                                                [facility]



     RX) tablet                                                        Branch

 

     prenatal            Yes            1{tbl}      Take 1 Tab           [facility]

nivers



     vitamin      7-10                               by mouth           [facility]



     w/FA      00:00:                               daily.           [place]



     ([facility]      00                                                [facility]



     RX) tablet                                                        Branch

 

     prenatal            Yes            1{tbl}      Take 1 Tab           [facility]

nivers



     vitamin      7-10                               by mouth           [facility]



     w/FA      00:00:                               daily.           [place]



     ([facility]      00                                                [facility]



     RX) tablet                                                        Branch

 

     prenatal       2020- No             1{tbl}      Take 1 Tab           

[facility]



     vitamin      7-10 08-15                          by mouth           [facility]



     w/FA      00:00: 00:00                          daily.           [place]



     ([facility]      00   :00                                          [facility]



     RX) tablet                                                        Branch







Immunizations







           Ordered    Filled Immunization Date       Status     Comments   [facility]

e



           Immunization Name Name                                        

 

           TDAP                  2016 Completed             [facility]



                                 00:00:00                         [facility]



                                                                  [facility]

 

           TDAP                  2016 Completed             [facility]



                                 00:00:00                         [facility]



                                                                  [facility]

 

           TDAP                  2016 Completed             [facility]



                                 00:00:00                         [facility]



                                                                  [facility]

 

           TDAP                  2016 Completed             [facility]



                                 00:00:00                         [facility]



                                                                  [facility]

 

           TDAP                  2016 Completed             [facility]



                                 00:00:00                         [facility]



                                                                  [facility]

 

           TDAP                  2016 Completed             [facility]



                                 00:00:00                         [facility]



                                                                  [facility]

 

           TDAP                  2016 Completed             [facility]



                                 00:00:00                         [facility]



                                                                  [facility]

 

           TDAP                  2016 Completed             [facility]



                                 00:00:00                         [facility]



                                                                  [facility]

 

           TDAP                  2016 Completed             [facility]



                                 00:00:00                         [facility]



                                                                  [facility]

 

           TDAP                  2016 Completed             [facility]



                                 00:00:00                         [facility]



                                                                  [facility]

 

           TDAP                  2016 Completed             [facility]



                                 00:00:00                         [facility]



                                                                  [facility]

 

           TDAP                  2016 Completed             [facility]



                                 00:00:00                         [facility]



                                                                  [facility]

 

           TDAP                  2016 Completed             [facility]



                                 00:00:00                         [facility]



                                                                  [facility]

 

           TDAP                  2016 Completed             [facility]



                                 00:00:00                         [facility]



                                                                  [facility]

 

           TDAP                  2016 Completed             [facility]



                                 00:00:00                         [facility]



                                                                  [facility]

 

           TDAP                  2016 Completed             [facility]



                                 00:00:00                         [facility]



                                                                  [facility]

 

           TDAP                  2016 Completed             [facility]



                                 00:00:00                         [facility]



                                                                  [facility]

 

           TDAP                  2016 Completed             [facility]



                                 00:00:00                         [facility]



                                                                  [facility]

 

           TDAP                  2016 Completed             [facility]



                                 00:00:00                         [facility]



                                                                  [facility]

 

           TDAP                  2016 Completed             [facility]



                                 00:00:00                         [facility]



                                                                  [facility]

 

           TDAP                  2016 Completed             [facility]



                                 00:00:00                         [facility]



                                                                  [facility]

 

           TDAP                  2016 Completed             [facility]



                                 00:00:00                         [facility]



                                                                  [facility]

 

           TDAP                  2016 Completed             [facility]



                                 00:00:00                         [facility]



                                                                  [facility]

 

           TDAP                  2016 Completed             [facility]



                                 00:00:00                         [facility]



                                                                  Branch

 

           Td                    2008 Completed             [facility]



                                 00:00:00                         [facility]



                                                                  [facility]

 

           Td                    2008 Completed             [facility]



                                 00:00:00                         [facility]



                                                                  [facility]

 

           Td                    2008 Completed             [facility]



                                 00:00:00                         [facility]



                                                                  [facility]

 

           Td                    2008 Completed             [facility]



                                 00:00:00                         [facility]



                                                                  [facility]

 

           Td                    2008 Completed             [facility]



                                 00:00:00                         [facility]



                                                                  [facility]

 

           Td                    2008 Completed             [facility]



                                 00:00:00                         [facility]



                                                                  [facility]

 

           Td                    2008 Completed             [facility]



                                 00:00:00                         [facility]



                                                                  [facility]

 

           Td                    2008 Completed             [facility]



                                 00:00:00                         [facility]



                                                                  [facility]

 

           Td                    2008 Completed             [facility]



                                 00:00:00                         [facility]



                                                                  [facility]

 

           Td                    2008 Completed             [facility]



                                 00:00:00                         [facility]



                                                                  [facility]

 

           Td                    2008 Completed             [facility]



                                 00:00:00                         [facility]



                                                                  [facility]

 

           Td                    2008 Completed             [facility]



                                 00:00:00                         [facility]



                                                                  [facility]

 

           Td                    2008 Completed             [facility]



                                 00:00:00                         [facility]



                                                                  [facility]

 

           Td                    2008 Completed             [facility]



                                 00:00:00                         [facility]



                                                                  [facility]

 

           Td                    2008 Completed             [facility]



                                 00:00:00                         [facility]



                                                                  [facility]

 

           Td                    2008 Completed             [facility]



                                 00:00:00                         [facility]



                                                                  [facility]

 

           Td                    2008 Completed             [facility]



                                 00:00:00                         [facility]



                                                                  [facility]

 

           Td                    2008 Completed             [facility]



                                 00:00:00                         [facility]



                                                                  [facility]

 

           Td                    2008 Completed             [facility]



                                 00:00:00                         [facility]



                                                                  [facility]

 

           Td                    2008 Completed             [facility]



                                 00:00:00                         [facility]



                                                                  [facility]

 

           Td                    2008 Completed             [facility]



                                 00:00:00                         [facility]



                                                                  [facility]

 

           Td                    2008 Completed             [facility]



                                 00:00:00                         [facility]



                                                                  Br[facility]

 

           Td                    2008 Completed             [facility]



                                 00:00:00                         [facility]



                                                                  Br[facility]

 

           Td                    2008 Completed             [facility]



                                 00:00:00                         [facility]



                                                                  Branch







Vital Signs







             Vital Name   Observation Time Observation Value Comments     Source

 

             Systolic blood 2020-10-01 15:55:00 121 mm[Hg]                [facility]

[facility]



             pressure                                            [facility]



                                                                 Branch

 

             Diastolic blood 2020-10-01 15:55:00 79 mm[Hg]                 [facility]

rs[facility] of



             pressure                                            [facility]

 

             Heart rate   2020-10-01 15:55:00 66 /min                   [facility]

ty [facility]

 

             Body temperature 2020-10-01 15:55:00 36.67 Yolanda                 Univ

ersity of



                                                                 [facility]

 

             Respiratory rate 2020-10-01 15:55:00 18 /min                   [facility]

 

             Body height  2020-10-01 15:55:00 152.4 cm                  [facility]

ty of



                                                                 [facility]



                                                                 [place]

 

             Body weight  2020-10-01 15:55:00 73.936 kg                 [facility]

ty of



                                                                 [facility]



                                                                 [facility]anch

 

             BMI          2020-10-01 15:55:00 31.83 kg/m2               [facility]

ty of



                                                                 [facility]



                                                                 Branch

 

             Systolic blood 2020-09-15 21:58:00 109 mm[Hg]                [facility]

s[facility] of



             pressure                                            [facility]



                                                                 Branch

 

             Diastolic blood 2020-09-15 21:58:00 71 mm[Hg]                 [facility]

rsity of



             pressure                                            [facility]



                                                                 Branch

 

             Heart rate   2020-09-15 21:58:00 78 /min                   [facility]

ty of



                                                                 [facility]



                                                                 Branch

 

             Body temperature 2020-09-15 21:58:00 36.89 Yolanda                 Univ

ersity of



                                                                 [facility]



                                                                 Branch

 

             Respiratory rate 2020-09-15 21:58:00 18 /min                   Univ

ersity of



                                                                 [facility]

 

             Body height  2020-09-15 21:58:00 152.4 cm                  Univers[facility]

ty of



                                                                 [facility]

 

             Body weight  2020-09-15 21:58:00 73.483 kg                 [facility]

ty of



                                                                 [facility]



                                                                 Branch

 

             BMI          2020-09-15 21:58:00 31.64 kg/m2               Univers[facility]

ty of



                                                                 [facility]



                                                                 Branch

 

             Systolic blood 2020-08-15 14:45:00 140 mm[Hg]                [facility]

sity of



             pressure                                            [facility]



                                                                 Branch

 

             Diastolic blood 2020-08-15 14:45:00 86 mm[Hg]                 Unive

rsity of



             pressure                                            [facility]

 

             Heart rate   2020-08-15 14:45:00 71 /min                   Univers[facility]

ty of



                                                                 [facility]

 

             Body temperature 2020-08-15 13:30:00 36.67 Yolanda                 Univ

ersity of



                                                                 [facility]

 

             Respiratory rate 2020-08-15 13:30:00 18 /min                   Univ

ersity of



                                                                 [facility]



                                                                 Branch

 

             Oxygen saturation in 2020-08-15 09:15:00 99 /min                   

[facility]



             Arterial blood by                                        [facility]

neal



             Pulse oximetry                                        Branch

 

             Body height  2020 09:00:00 152.4 cm                  [facility]

ty of



                                                                 [facility]



                                                                 Branch

 

             Body weight  2020 09:00:00 82.192 kg                 [facility]

ty of



                                                                 [facility]



                                                                 Branch

 

             BMI          2020 09:00:00 35.39 kg/m2               Univers[facility]

ty of



                                                                 [facility]



                                                                 Branch

 

             Systolic blood 2020 21:30:00 136 mm[Hg]                [facility]

sity of



             pressure                                            [facility]



                                                                 Branch

 

             Diastolic blood 2020 21:30:00 77 mm[Hg]                 [facility]

rsity of



             pressure                                            [facility]



                                                                 Branch

 

             Heart rate   2020 21:30:00 69 /min                   [facility]

ty of



                                                                 [facility]



                                                                 Branch

 

             Respiratory rate 2020 21:30:00 18 /min                   Univ

ersity of



                                                                 [facility]



                                                                 Branch

 

             Oxygen saturation in 2020 21:30:00 100 /min                  

[facility]



             Arterial blood by                                        [facility]

neal



             Pulse oximetry                                        Branch

 

             Body temperature 2020 20:25:00 36.89 Yolanda                 Univ

ersity of



                                                                 [facility]



                                                                 Branch

 

             Body height  2020 20:25:00 152.4 cm                  [facility]

ty of



                                                                 [facility]



                                                                 Branch

 

             Body weight  2020 20:25:00 82.555 kg                 [facility]

ty of



                                                                 [facility]



                                                                 Branch

 

             BMI          2020 20:25:00 35.54 kg/m2               [facility]

ty of



                                                                 [facility]



                                                                 Branch

 

             Systolic blood 2020 19:12:00 140 mm[Hg]                [facility]

sity of



             pressure                                            [facility]



                                                                 Branch

 

             Diastolic blood 2020 19:12:00 91 mm[Hg]                 [facility]

rsity of



             pressure                                            [facility]



                                                                 Branch

 

             Heart rate   2020 18:44:00 73 /min                   [facility]

ty of



                                                                 [facility]



                                                                 Branch

 

             Body temperature 2020 18:44:00 36.89 Yolanda                 Univ

ersity of



                                                                 [facility]



                                                                 Branch

 

             Respiratory rate 2020 18:44:00 18 /min                   Univ

ersity of



                                                                 [facility]



                                                                 Branch

 

             Body height  2020 18:44:00 152.4 cm                  [facility]

ty of



                                                                 [facility]



                                                                 Branch

 

             Body weight  2020 18:44:00 83.008 kg                 [facility]

ty of



                                                                 [facility]



                                                                 Branch

 

             BMI          2020 18:44:00 35.74 kg/m2               [facility]

ty of



                                                                 [facility]



                                                                 Branch

 

             Heart rate   2020 17:00:00 85 /min                   Univers[facility]

ty of



                                                                 [facility]



                                                                 Branch

 

             Oxygen saturation in 2020 17:00:00 100 /min                  

[facility]



             Arterial blood by                                        [facility]

neal



             Pulse oximetry                                        Branch

 

             Systolic blood 2020 16:41:00 134 mm[Hg]                [facility]

sity of



             pressure                                            [facility]



                                                                 Branch

 

             Diastolic blood 2020 16:41:00 90 mm[Hg]                 Unive

rsity of



             pressure                                            [facility]



                                                                 Branch

 

             Respiratory rate 2020 16:41:00 18 /min                   Univ

ersity of



                                                                 [facility]



                                                                 Branch

 

             Body height  2020 16:41:00 152.4 cm                  Universi

ty of



                                                                 [facility]



                                                                 Branch

 

             Body weight  2020 16:41:00 78.654 kg                 Universi

ty of



                                                                 [facility]



                                                                 Branch

 

             BMI          2020 16:41:00 33.86 kg/m2               Universi

ty of



                                                                 [facility]



                                                                 Branch

 

             Systolic blood 2020 13:49:00 131 mm[Hg]                Univer

sity of



             pressure                                            [facility]



                                                                 Branch

 

             Diastolic blood 2020 13:49:00 88 mm[Hg]                 Unive

rsity of



             pressure                                            [facility]



                                                                 Branch

 

             Heart rate   2020 13:49:00 66 /min                   Universi

ty of



                                                                 [facility]



                                                                 Branch

 

             Body temperature 2020 13:49:00 36.72 Yolanda                 Univ

ersity of



                                                                 [facility]



                                                                 Branch

 

             Respiratory rate 2020 13:49:00 18 /min                   Univ

ersity of



                                                                 [facility]



                                                                 Branch

 

             Body height  2020 13:49:00 152.4 cm                  Universi

ty of



                                                                 [facility]



                                                                 Branch

 

             Body weight  2020 13:49:00 78.926 kg                 Universi

ty of



                                                                 [facility]



                                                                 Branch

 

             BMI          2020 13:49:00 33.98 kg/m2               Universi

ty of



                                                                 [facility]



                                                                 Branch

 

             Systolic blood 2020 15:32:00 125 mm[Hg]                Univer

sity of



             pressure                                            [facility]



                                                                 Branch

 

             Diastolic blood 2020 15:32:00 87 mm[Hg]                 Unive

rsity of



             pressure                                            [facility]



                                                                 Branch

 

             Heart rate   2020 15:32:00 104 /min                  Universi

ty of



                                                                 [facility]



                                                                 Branch

 

             Body temperature 2020 15:32:00 36.72 Yolanda                 Univ

ersity of



                                                                 [facility]



                                                                 Branch

 

             Respiratory rate 2020 15:32:00 18 /min                   Univ

ersity of



                                                                 [facility]



                                                                 Branch

 

             Body height  2020 15:32:00 152.4 cm                  Universi

ty of



                                                                 [facility]



                                                                 Branch

 

             Body weight  2020 15:32:00 79.017 kg                 Universi

ty of



                                                                 [facility]



                                                                 Branch

 

             BMI          2020 15:32:00 34.02 kg/m2               Universi

ty of



                                                                 [facility]



                                                                 Branch

 

             Systolic blood 2020 01:45:00 131 mm[Hg]                Univer

sity of



             pressure                                            [facility]



                                                                 Branch

 

             Diastolic blood 2020 01:45:00 76 mm[Hg]                 Unive

rsity of



             pressure                                            [facility]

 

             Heart rate   2020 01:45:00 89 /min                   [facility]

 

             Oxygen saturation in 2020 01:40:00 100 /min                  

[facility]



             Arterial blood by                                        [facility]

ne[facility]



             Pulse oximetry                                        [place]

 

             Body temperature 2020 01:00:00 37.11 Yolanda                 [facility]

 

             Respiratory rate 2020 01:00:00 18 /min                   [facility]

 

             Body height  2020 01:00:00 152.4 cm                  [facility]

 

             Body weight  2020 01:00:00 79.833 kg                 [facility]

 

             BMI          2020 01:00:00 34.37 kg/m2               [facility]







Procedures







                Procedure       Date / Time     Performing Clinician Source



                                Performed                       

 

                OB/GYN CLINIC ULTRASOUND 2020-10-01 05:01:00 Doctor Unassigned, 

No [facility]

 

                POCT PREGNANCY TEST 2020-10-01 00:00:00 Yin Pineda   [facility]

 

                CBC WITH DIFF   2020-08-15 09:12:00 [facility]

 

                VENOUS CORD GAS 2020 16:09:00 Miriam [facility]

 

                SGOT (ASPARTATE AMINO 2020 10:13:00 Yin Pineda Cam   [facility]

[facility] [place]



                TRANSFER)                                       Medical Branch

 

                CREATININE      2020 10:13:00 Miriam [facility]

 

                ALANINE AMINO   2020 10:13:00 Miriam [facility]



                TRANSFERASE(SGPT                                 Medical Branch

 

                LACTATE DEHYDROGENASE 2020 10:13:00 Miriam [facility]

 

                URIC ACID       2020 10:13:00 Miriam [facility]

 

                CBC WITH DIFF   2020 10:13:00 Miriam [facility]

 

                URINALYSIS      2020 10:13:00 Miriam [facility]

 

                HEPATITIS B SURFACE 2020 10:13:00 Yin Pineda [facility]

 

                ADC OR BARNEY ONLY - 2020 10:13:00 Pineda, Yin Cam   [facility]



                RPR                                             Medical Branch

 

                PROTEIN CREAT RATIO 2020 10:13:00 Yin Pineda   [facility]



                URINE RANDOM                                    Medical Branch

 

                HIV 1/2 AG-AB WITH 2020 10:13:00 Yin Pineda   [facility]



                REFLEX                                          Medical Branch

 

                HB ABO GROUPING 2020 10:05:00 Yin Pineda [facility] o

f [facility]

 

                RHO (D) IMMUNE GLOBULIN 2020 10:05:00 ÁngelWilnern     [facility]



                                                                Medical [place]

 

                NOTICE OF PRIVACY 2020 09:06:12 Doctor Unassigned, No [facility]            Medical Branch

 

                CONSENT/REFUSAL FOR 2020 09:05:59 Doctor Unassigned, No [facility]



                DIAGNOSIS AND TREATMENT                 [facility]            Medical 

Branch

 

                COVID-19 (ID NOW RAPID 2020 20:11:00 Yin Pineda   [facility]



                TESTING)                                        Medical Branch

 

                NOTICE OF PRIVACY 2020 19:49:09 Doctor Unassigned, No [facility]            Medical Branch

 

                CONSENT/REFUSAL FOR 2020 19:48:51 Doctor Unassigned, No [facility]

[facility] 

[place]



                DIAGNOSIS AND TREATMENT                 [facility]            Medical 

Branch

 

                ASSIGNMENT OF BENEFITS 2020 19:48:36 Doctor Unassigned, No

 [facility]

 

                DSU PRE-OP      2020 05:01:00 Doctor Unassigned, No [facility]

 

                L&D VISIT       2020 05:01:00 Doctor Unassigned, No [facility]



                (NON-DELIVERED)                 [facility]

 

                URINALYSIS      2020 17:32:00 Yin Pineda   [facility] o

f [facility] Branch

 

                ASSIGNMENT OF BENEFITS 2020 16:21:25 Doctor Unassigned, No

 [facility]

 

                POCT URINALYSIS W/O 2020 00:00:00 Yin Pineda   [facility]



                SPECIFIC GRAVITY                                 Medical Branch

 

                ASSIGNMENT OF BENEFITS 2020 13:04:25 Doctor Unassigned, No

 [facility]

 

                POCT URINALYSIS W/O 2020 15:35:00 Yin Pineda Cam   [facility]

[facility] [place]



                SPECIFIC GRAVITY                                 Medical Branch

 

                AUTHORIZATION TO RELEASE 2020 05:01:00 Doctor Unassigned, 

No [facility]



                PHI TO [facility]                     Name            Medical Branch

 

                ADC CLC OR [facility]CC ONLY - 2020 01:18:00 Edson Galdamez

[facility]                                        Medical Branch

 

                ASSIGNMENT OF BENEFITS 2020 00:30:19 Doctor Unassigned, No

 [facility]



                                                Name            Medical [place]

 

                NOTICE OF PRIVACY 2020 00:30:04 Doctor Unassigned, No [facility]                       Name            Medical Branch

 

                ASSIGNMENT OF BENEFITS 2020 00:29:52 Doctor Unassigned, No

 [facility] Branch

 

                L&D VISIT       2020 05:01:00 Doctor Unassigned, No [facility]



                (NON-DELIVERED)                 [facility]







Encounters







        Start   End     Encounter Admission Attending Care    Care    Encounter 

Source



        Date/Time Date/Time Type    Type    Clinicians Facility Department ID   

   

 

        2021-10-29         Outpatient P               [facility]    ROSA     [id number]

 Univers



        02:37:05                                                         [facility]

 

        2021 Outpatient R       JACQUELYN [facility]    [id number]

[id number] Univers



        09:00:00 09:00:00                 EMILY                           [facility]

 

        2021 [facility]    1.2.840.114 [id number]

[id number] Univers



        00:00:00 00:00:00 Outreach         [facility] [id number]         i

ty of



                                        [facility]    [id number]         [place]

[place]



                                                PAV[place] [phone]         Me

dical



                                                        [address]             Branch

 

        2020-10-29 2020-10-29 Outpatient R       YIN PINEDA [facility]    [id number]

[id number] Univers



        10:30:00 10:30:00                                                 ity [facility]

 

        2020-10-02 2020-10-02 Telephone         Miriam [facility]    1.2.840.114 [id number]

[id number] Univers



        00:00:00 00:00:00                 Cam     Chi [id number]         i

ty of



                                                [place] [id number]         [place]

s



                                                [place] [phone]         Me

dical



                                                nal     [address]             Branch



                                                Building                 

 

        2020-10-01 2020-10-01 Office          Pineda, [facility]    [id number]

[id number] Univers



        10:21:54 11:14:24 Visit           Dilip Mireles [id number]         i

ty of



                                                [place] [id number]         [place]

s



                                                [place] [phone]         [address]                 

 

        2020-10-01 2020-10-01 Outpatient R       MIRIAM YIN [facility]    [id number]

[id number] Univers



        10:15:00 10:15:00                                                 ity of



                                                                        [facility]

 

        2020-10-01 2020-10-01 Orders          Doctor  BRYON    1.2.840.114 [id number]

[id number] Univers



        00:00:00 00:00:00 Only            Unassigned, ANNA   350.1.13.10       

  ity of



                                        [facility] [id number]         Jose Alberto

as



                                                        [phone]         [address]

 

        2020-09-15 2020-09-15 Routine         Stephanie Pinedaen Cam [facility]    1.2.840.114 

[id number] Univers



        16:49:24 17:08:59 Prenatal         Emily Valladares 350.1.13.10 

        ity of



                        Visit                   [place] [id number]         [place]

s



                                                [place] [phone]         [address]                 

 

        2020-09-15 2020-09-15 Outpatient R       JACQUELYN [facility]    [id number]

[id number] Univers



        16:30:00 16:30:00                 EMILY smith [facility]

 

        2020 Outpatient R       JACQUELYN [facility]    [id number]

[id number] Univers



        11:30:00 11:30:00                 EMILY smith [facility]

 

        2020-09-10 2020-09-10 Outpatient R       JACQUELYN [facility]    [id number]

[id number] Univers



        11:30:00 11:30:00                 EMILY                           amparo [facility]

 

        2020 Telephone         Miriam [facility]    1.2.840.114 [id number]

[id number] Univers



        00:00:00 00:00:00                 Dilip Mireles 350.1.13.10         i

ty of



                                                [place] [id number]         [place]

s



                                                [place] [phone]         [address]                 

 

        2020 2020-08-15 [facility]         Pineda, [facility]    1.2.840.114 [id number]

[id number] Univers



        04:08:00 17:55:00 Encounter         Cam     [place] 350.1.13.10        

 ity of



                                                [place] [id number]         [place]

[place]



                                                [place]  [phone]         [address]

 

        2020 Orders          Doctor  BRYON    1.2.840.114 [id number]

[id number] Univers



        00:00:00 00:00:00 Only            Unassigned, ANNA   350.1.13.10       

  ity of



                                        [facility] [id number]         Jose Alberto

as



                                                        [phone]         [address]

 

        2020 [facility]         Yin Pineda [facility]    1.2.840.114 [id number]

[id number] Univers



        14:41:00 17:30:00 Encounter         Cam     [place] 350.1.13.10        

 ity of



                                                [place] [id number]         [place]

s



                                                [place]  [phone]         [address]

 

        2020 Routine         Yin Pineda [facility]    [id number]

[id number] Univers



        13:10:49 14:23:13 Prenatal         Cam     [place] 350.1.13.10         

ity of



                        Visit                   [place] [id number]         [place]

s



                                                [facility] [phone]         Me

[address]                 

 

        2020 Outpatient R       YIN PINEDA [facility]    [id number]

[id number] Univers



        13:15:00 13:15:00                                                 ity of



                                                                        [facility]

 

        2020 Orders          Doctor  BRYON    1.2.840.114 [id number]

40 Univers



        00:00:00 00:00:00 Only            Unassigned, ANNA   350.1.13.10       

  ity of



                                        [facility] 4.2.7.2.686         Jose Alberto

as



                                                        [phone]         [address]

 

        2020 Outpatient R               [facility]    [id number]

[id number] Univers



        09:30:00 09:30:00                                                 ity of



                                                                        [facility]

 

        2020 [facility]         Yin Pineda Cam [facility]    1.2.840.114

 [id number] Univers



        11:24:22 14:25:00 Encounter         Martinezradha Edson Mireles 350.1.1

3.10         ity of



                                                [place] 4.2.7.2.686         [place]

s



                                                [place]  [phone]         Medi

neal



                                                        [address]             [place]

 

        2020 Outpatient P               [facility]    ROSA     [id number]

[id number] Univers



        11:22:00 11:22:00                                                 ity of



                                                                        [facility]

 

        2020 Outpatient P               [facility]    ROSA     [id number]

[id number] Univers



        11:22:00 11:22:00                                                 ity of



                                                                        [facility]

 

        2020 Orders          Doctor  BRYON    1.2.840.114 [id number]

[id number] Univers



        00:00:00 00:00:00 Only            Unassigned, ANNA   350.1.13.10       

  ity of



                                        [facility] [id number]         Jose Alberto

as



                                                        [phone]         Medi

neal



                                                        009             [place]

 

        2020-07-15 2020-07-15 Letter          Yin Pineda [facility]    [id number]

[id number] Univers



        00:00:00 00:00:00 (Out)           Dilip Mireles 350.1.13.10         i

ty of



                                                [place] 4.2.7.2.686         [place]

[place]



                                                [place] [phone]         Me

dical



                                                nal     134             [facility]                 

 

        2020 Routine         Yin Pineda [facility]    [id number]

[id number] Univers



        08:38:28 09:10:14 Prenatal         Dilip Mireles 350.1.13.10         

ity of



                        Visit                   [place] 4.2.7.2.686         [place]

s



                                                [place] [phone]         Me

dical



                                                nal     134             [facility]                 

 

        2020 Outpatient R       YIN PINEDA [facility]    [id number]

[id number] Univers



        08:30:00 08:30:00                                                 ity of



                                                                        [facility]

 

        2020 Technician         1, Adc Lab [facility]    1.2.840.114 

[id number] Univers



        08:03:44 08:18:44 Visit           Yin Pineda 350.1.13.10    

     ity of



                                                [place] 4.2.7.2.686         [place]

s



                                                [place]  [phone]         Medi

neal



                                                        353             [place]

 

        2020 Outpatient R               [facility]    [id number]

[id number] Univers



        08:00:00 08:00:00                                                 ity of



                                                                        [facility]

 

        2020 Orders          Doctor  BRYON    1.2.840.114 [id number]

[id number] Univers



        00:00:00 00:00:00 Only            Unassigned, ANNA   350.1.13.10       

  ity of



                                        [facility] 4.2.7.2.686         Jose Alberto

as



                                                        [phone]         Medi

neal



                                                        009             [place]

 

        2020-07-10 2020-07-10 Telephone         Yin Pineda [facility]    1.2.840.114 [id number]

[id number] Univers



        00:00:00 00:00:00                 Cam     [place] 350.1.13.10         i

ty of



                                                [place] 4.2.7.2.686         [place]

s



                                                [place] [phone]         Me

dical



                                                nal     134             [facility]                 

 

        2020 Telephone         Yin Pineda [facility]    1.2.840.114 [id number]

[id number] Univers



        00:00:00 00:00:00                 Cam     [place] 350.1.13.10         i

ty of



                                                [place] 4.2.7.2.686         [place]

s



                                                [place] [phone]         Me

dical



                                                nal     134             [facility]                 

 

        2020 Laboratory         Lab, Adc Fam Pob I [facility]    1.2.

840.114 [id number] 

Univers



        10:03:27 10:23:27 Only            Hannah Griffiths [facility]  350.1.13.10  

       ity of



                                                [place] 4.2.7.2.686         Jose Alberto

as



                                                [employer] [phone]         Me

dical



                                                nal     [address]             [address]



                                                Office                  



                                                Building                 



                                                One                     

 

        2020 Outpatient R       TUNDE [facility]    [id number]

[id number] Univers



        10:00:00 10:00:00                 HANNAH smith of



                                                                        [facility]

 

        2020 Outpatient R       GAIL, [facility]    [id number]

[id number] Univers



        08:30:00 08:30:00                 [place]                            ity of



                                                                        [facility]

 

        2020 Initial         Yin Pineda [facility]    [id number]

[id number] Univers



        10:10:50 11:22:06 Prenatal         Cam     [place] 350.1.13.10         

ity of



                        Visit                   [place] 4.2.7.2.686         [place]

s



                                                [facility] [phone]         Me

dical



                                                [facility]     134             [facility]                 

 

        2020 Outpatient R       YIN PINEDA [facility]    [id number]

[id number] Univers



        10:00:00 10:00:00                                                 ity of



                                                                        [facility]

 

        2020 Orders          Doctor  BRYON    [id number]

[id number] Univers



        00:00:00 00:00:00 Only            Unassigned, ANNA   350.1.13.10       

  ity of



                                        [facility] 4.2.7.2.686         Jose Alberto

as



                                                        [phone]         Medi

neal



                                                        [address]             [place]

 

        [facility]    [id number]

[id number] Univers



        19:31:00 21:00:00 Encounter         Edson [place]ang[place] 350.1.13.10        

 ity of



                                                [place] 4.2.7.2.686         [place]

s



                                                [place]  [phone]         Medi

neal



                                                        [address]             [place]

 

        2020 Outpatient P       MARTINEZEDSON MCDONALD [facility]    ROSA

     [id number] 

Univers



        19:31:00 19:31:00                 CHRISEDSON HALE                   

      [facility]

 

        2020 Emergency E               [facility]    7501    

[facility]



        08:29:00 08:29:00                                                 

 

        2019 Emergency E               [facility]    7500    

[facility]



        12:43:00 12:43:00                                                 







Results







           Test Description Test Time  Test Comments Results    Result Comments 

Source









                    POCT PREGNANCY TEST 2020-10-01 15:56:00 









                      Test Item  Value      Reference Range Interpretation Comme

nts









             POCT PREG (test code = 1605) Negative                              

 

 

             On board controls acceptable with C Line (test code = 3574) Yes    

                                

 

             POCT PREG LOT # (test code = 3575)                                 

       

 

             POCT PREG TEST  DATE (test code = 3576)                      

                  

 

             Lab Interpretation (test code = 63906-2) Normal                    

             



[facility] 15[facility]56:00





             Test Item    Value        Reference Range Interpretation Comments

 

             POCT PREG (test code = 1605) Negative                              

 

 

             On board controls acceptable with C Yes                            

        



             Line (test code = 3574)                                        

 

             POCT PREG LOT # (test code = 3575)                                 

       

 

             POCT PREG TEST  DATE (test                                   

     



             code = 3576)                                        

 

             Lab Interpretation (test code = Normal                             

    



             03755-4)                                            



[facility] with Differential2020-08-15 09:49:00





             Test Item    Value        Reference Range Interpretation Comments

 

             WBC (test code =              See_Comment                [Automated



             6690-2)                                             message] The sy

stem



                                                                 which generated



                                                                 this result



                                                                 transmitted



                                                                 reference range

:



                                                                 4.30 - 11.10



                                                                 10*3/?L. The



                                                                 reference range

 was



                                                                 not used to



                                                                 interpret this



                                                                 result as



                                                                 normal/abnormal

.

 

             RBC (test code =              See_Comment  L             [Automated



             789-8)                                              message] The sy

stem



                                                                 which generated



                                                                 this result



                                                                 transmitted



                                                                 reference range

:



                                                                 3.93 - 5.25



                                                                 10*6/?L. The



                                                                 reference range

 was



                                                                 not used to



                                                                 interpret this



                                                                 result as



                                                                 normal/abnormal

.

 

             HGB (test code = 10.1 g/dL    11.6-15      L            



             718-7)                                              

 

             HCT (test code = 31.3 %       35.7-45.2    L            



             4544-3)                                             

 

             MCV (test code = 80.9 fL      80.6-95.5                 



             787-2)                                              

 

             MCH (test code = 26.1 pg      25.9-32.8                 



             785-6)                                              

 

             MCHC (test code = 32.3 g/dL    31.6-35.1                 



             786-4)                                              

 

             RDW-SD (test code = 42.1 fL      39-49.9                   



             01535-9)                                            

 

             RDW-CV (test code = 14.6 %       12-15.5                   



             788-0)                                              

 

             PLT (test code =              See_Comment                [Automated



             777-3)                                              message] The sy

stem



                                                                 which generated



                                                                 this result



                                                                 transmitted



                                                                 reference range

:



                                                                 166 - 358 10*3/

?L.



                                                                 The reference r

vicki



                                                                 was not used to



                                                                 interpret this



                                                                 result as



                                                                 normal/abnormal

.

 

             MPV (test code = 13.4 fL      9.5-12.9     H            



             21406-3)                                            

 

             IPF % (test code = 9.0 %        1.3-7.7      H            Platelet 

count



             [id number])                                         measured by



                                                                 fluorescence



                                                                 method.

 

             NRBC/100 WBC (test              See_Comment                [Automat

ed



             code = 1937646315)                                        message] 

The system



                                                                 which generated



                                                                 this result



                                                                 transmitted



                                                                 reference range

:



                                                                 0.0 - 10.0 /100



                                                                 WBCs. The refer

ence



                                                                 range was not u

sed



                                                                 to interpret th

is



                                                                 result as



                                                                 normal/abnormal

.

 

             NRBC x10^3 (test code <0.01        See_Comment                [Auto

mated



             = 5923167109)                                        message] The s

ystem



                                                                 which generated



                                                                 this result



                                                                 transmitted



                                                                 reference range

:



                                                                 10*3/?L. The



                                                                 reference range

 was



                                                                 not used to



                                                                 interpret this



                                                                 result as



                                                                 normal/abnormal

.

 

             GRAN MAT (NEUT) % 67.8 %                                 



             (test code = 770-8)                                        

 

             IMM GRAN % (test code 0.40 %                                 



             = 9157724304)                                        

 

             LYMPH % (test code = 22.5 %                                 



             736-9)                                              

 

             MONO % (test code = 6.3 %                                  



             5905-5)                                             

 

             EOS % (test code = 2.6 %                                  



             713-8)                                              

 

             BASO % (test code = 0.4 %                                  



             706-2)                                              

 

             GRAN MAT x10^3(ANC) 6.25 10*3/uL 1.88-7.09                 



             (test code =                                        



             7552263553)                                         

 

             IMM GRAN x10^3 (test 0.04 10*3/uL 0-0.06                    



             code = 0024677557)                                        

 

             LYMPH x10^3 (test code 2.07 10*3/uL 1.32-3.29                 



             = 731-0)                                            

 

             MONO x10^3 (test code 0.58 10*3/uL 0.33-0.92                 



             = 742-7)                                            

 

             EOS x10^3 (test code = 0.24 10*3/uL 0.03-0.39                 



             711-2)                                              

 

             BASO x10^3 (test code 0.04 10*3/uL 0.01-0.07                 



             = 704-7)                                            

 

             Lab Interpretation Abnormal                               



             (test code = 15191-5)                                        



[facility] - [id number] 04:56:00





             Test Item    Value        Reference Range Interpretation Comments

 

             RPR (Qualitative) (test code = Nonreactive  Nonreactive            

   



             45700-4)                                            

 

             Lab Interpretation (test code = Normal                             

    



             86442-5)                                            



[facility]MM[facility]BULIN2020-08-15 00:39:42





             Test Item    Value        Reference Range Interpretation Comments

 

             RHIG CANDIDATE? No- see comment                           Patient i

s not a



             (test code =                                        candidate for R

hIg-



             5055)                                               Patient is Rh



                                                                 Positive.Perfor

med at



                                                                 [facility]



                                                                 Services - [facility] 

Blood



                                                                 [address] [place] [address]



                                                                 [id number] 

Free:



                                                                 [phone]

A No.



                                                                 [id number]



[facility] Hospital[facility] [id number] 16:25:00





             Test Item    Value        Reference Range Interpretation Comments

 

             VENOUS BASE EXCESS,              mEq/L                     



             CORD (test code =                                        



             7816077217)                                         

 

             VENOUS PH, CORD (test              7.25-7.45                 



             code = 0134913500)                                        

 

             VENOUS PC02, CORD              See_Comment                [Automate

d message] The



             (test code =                                        system which ge

nerated



             8585062711)                                         this result tra

nsmitted



                                                                 reference range

: 27 - 49



                                                                 mmHg. The refer

ence range



                                                                 was not used to

 interpret



                                                                 this result as



                                                                 normal/abnormal

.

 

             VENOUS PO2, CORD (test              See_Comment                [Aut

omated message] The



             code = 1917445892)                                        system [facility] generated



                                                                 this result tra

nsmitted



                                                                 reference range

: 17 - 41



                                                                 mmHg. The refer

ence range



                                                                 was not used to

 interpret



                                                                 this result as



                                                                 normal/abnormal

.

 

             VENOUS BICARBONATE,              See_Comment                [Automa

karlo message] The



             CORD (test code =                                        system [facility] generated



             1457443439)                                         this result tra

nsmitted



                                                                 reference range

: 12 - 29



                                                                 mEq/L. The refe

rence range



                                                                 was not used to

 interpret



                                                                 this result as



                                                                 normal/abnormal

.



[facility] Hospital[facility] [id number] 16:22:00





             Test Item    Value        Reference Range Interpretation Comments

 

             BASE EXCESS, CORD              mEq/L                     



             (test code =                                        



             1209813152)                                         

 

             AC PH, CORD (BEAKER)              7.18-7.38                 



             (test code =                                        



             0556829025)                                         

 

             PC02, CORD (test code              See_Comment                [Auto

mated message] The



             = 7040535734)                                        system which g

enerated this



                                                                 result transmit

karlo



                                                                 reference range

: 32 - 66



                                                                 mmHg. The refer

ence range



                                                                 was not used to

 interpret



                                                                 this result as



                                                                 normal/abnormal

.

 

             PO2, CORD (test code              See_Comment                [Autom

ated message] The



             = 8879539180)                                        system which g

enerated this



                                                                 result transmit

karlo



                                                                 reference range

: 10 - 30



                                                                 mmHg. The refer

ence range



                                                                 was not used to

 interpret



                                                                 this result as



                                                                 normal/abnormal

.

 

             BICARBONATE, CORD              See_Comment                [Automate

d message] The



             (test code =                                        system which ge

nerated this



             5364893519)                                         result transmit

karlo



                                                                 reference range

: 17 - 27



                                                                 mEq/L. The refe

rence range



                                                                 was not used to

 interpret



                                                                 this result as



                                                                 normal/abnormal

.



[facility] HospitalHepatitis B Surface [id number] 16:05:00





             Test Item    Value        Reference Range Interpretation Comments

 

             HBsAg Semi-Quantitative (test code = Negative     Negative         

         



             5195-3)                                             



[facility] HospitalHIV [facility]2 AG-AB WITH [id number] 12:01:00





             Test Item    Value        Reference Range Interpretation Comments

 

             HIV          Negative     Negative                  



             Semi-quantitative                                        



             (test code =                                        



             46228-0)                                            

 

             TANO (test code = Non-reactive for HIV-1                           



             TANO)         antigen and HIV-1/HIV-2                           



                          antibodies. ?No                           



                          laboratory evidence of                           



                          HIV infection. ?Repeat in                           



                          2-4 weeks if acute HIV                           



                          infection is suspected.                           



[facility] HospitalUrinalysis2020-08-14 11:19:00





             Test Item    Value        Reference Range Interpretation Comments

 

             APPEARANCE (test code = Hazy         Clear        A            



             8876432213)                                         

 

             COLOR (test code = Yellow       Yellow                    



             8380349320)                                         

 

             PH (test code =              4.8-8.0                   



             0080543616)                                         

 

             SP GRAVITY (test code =              1.003-1.030               



             4700670809)                                         

 

             GLU U QUAL (test code = Normal       Normal                    



             8899985451)                                         

 

             BLOOD (test code = Negative     Negative                  



             6649142838)                                         

 

             KETONES (test code = 20 mg/dL     Negative     A            



             3766028999)                                         

 

             PROTEIN (test code = Negative     Negative                  



             2887-8)                                             

 

             UROBILIN (test code = 2.0 mg/dL    Normal       A            



             2848014400)                                         

 

             BILIRUBIN (test code = Negative     Negative                  



             6863525066)                                         

 

             NITRITE (test code = Negative     Negative                  



             4454697760)                                         

 

             LEUK DERECK (test code = Negative     Negative                  



             3910231998)                                         

 

             RBC/HPF (test code =              See_Comment                [Autom

ated message]



             7498369270)                                         The system [employer]



                                                                 generated this



                                                                 result transmit

karlo



                                                                 reference range

: 0 -



                                                                 3 HPF. The refe

rence



                                                                 range was not u

sed



                                                                 to interpret th

is



                                                                 result as



                                                                 normal/abnormal

.

 

             WBC/HPF (test code =              See_Comment                [Autom

ated message]



             7901851654)                                         The system [employer]



                                                                 generated this



                                                                 result transmit

karlo



                                                                 reference range

: 0 -



                                                                 5 HPF. The refe

rence



                                                                 range was not u

sed



                                                                 to interpret th

is



                                                                 result as



                                                                 normal/abnormal

.

 

             BACTERIA (test code = Negative     Negative                  



             [id number])                                         

 

             MUCOUS (test code = Moderate     Negative LPF A            



             9005886747)                                         

 

             SQ EPITH (test code =              HPF                       



             3832262880)                                         

 

             CA OXALATE (test code =              See_Comment  H             [Au

tomated message]



             8716747568)                                         The system [employer]



                                                                 generated this



                                                                 result transmit

karlo



                                                                 reference range

: <=1



                                                                 HPF. The refere

nce



                                                                 range was not u

sed



                                                                 to interpret th

is



                                                                 result as



                                                                 normal/abnormal

.

 

             UR AC MEENA (test code =              See_Comment  H             [Au

tomated message]



             3218653749)                                         The system [employer]



                                                                 generated this



                                                                 result transmit

karlo



                                                                 reference range

: <=1



                                                                 HPF. The refere

nce



                                                                 range was not u

sed



                                                                 to interpret th

is



                                                                 result as



                                                                 normal/abnormal

.

 

             Lab Interpretation (test Abnormal                               



             code = 34484-9)                                        



[facility] [id number] 11:11:50





             Test Item    Value        Reference Range Interpretation Comments

 

             ABO & RH (test code A Positive                             Performe

d at [facility]



             = 20)                                               Laboratory Serv

[facility] [place], [place] [address]Toll 

Free:



                                                                 [phone]

A No.



                                                                 [id number]

 

             IAT (test code = Negative                               Performed a

t [facility]



             1185)                                               Laboratory Serv

[facility]

[address] [place], [place] [address]Toll 

Free:



                                                                 [phone]

A No.



                                                                 [id number]



[facility] Ratio Urine [id number] 
10:45:00





             Test Item    Value        Reference Range Interpretation Comments

 

             T. PROT U (test code = 2888-6) 21 mg/dL                            

   

 

             CREAT U (test code = 9704738501) 132.6 mg/dL                       

     

 

             Protein/Creatinine Ratio Urine              0.0-2.0                

   



             (test code = 9363380859)                                        



[facility] [id number] 10:43:00





             Test Item    Value        Reference Range Interpretation Comments

 

             URIC ACID (test code = 8732159402) 4.3 mg/dL    2.9-6              

       

 

             Lab Interpretation (test code = Normal                             

    



             52163-2)                                            



[facility] Hospital[facility] Transfer[facility] (SGPT)2020 
10:43:00





             Test Item    Value        Reference Range Interpretation Comments

 

             ALTv (test code = 1742-6) 11 U/L       5-35                      

 

             Lab Interpretation (test code = Normal                             

    



             98945-8)                                            



[facility] Rptpgjgwdvdtc2638-82-11 10:43:00





             Test Item    Value        Reference Range Interpretation Comments

 

             LDH (test code = 4500522592) 403 U/L      300-600                  

 

 

             Lab Interpretation (test code = Normal                             

    



             43272-0)                                            



[facility] [id number] 10:42:00





             Test Item    Value        Reference Range Interpretation Comments

 

             CREATININE (test code = 0.37 mg/dL   0.5-1.04     L            



             7379011657)                                         

 

             eGFR Calculation              mL/min/1.73m2              



             (Non-)                                        



             (test code =                                        



             6961409509)                                         

 

             eGFR Calculation              mL/min/1.73m2              



             (African American)                                        



             (test code =                                        



             3448100122)                                         

 

             TANO (test code = TANO) Association of                           



                          Glomerular Filtration                           



                          Rate (GFR) and Staging                           



                          of Kidney Disease*                           



                          +---------------------                           



                          --+-------------------                           



                          --+-------------------                           



                          ------+| GFR                           



                          (mL/min/1.73 m2) ?|                           



                          With Kidney Damage ?|                           



                          ?Without Kidney                           



                          Damage+---------------                           



                          --------+-------------                           



                          --------+-------------                           



                          ------------+| ?>90 ?                           



                          ? ? ? ? ? ? ? ?|                           



                          ?Stage one ? ? ? ? ?|                           



                          ? Normal ? ? ? ? ? ? ?                           



                          ?+--------------------                           



                          ---+------------------                           



                          ---+------------------                           



                          -------+| ?60-89 ? ? ?                           



                          ? ? ? ? ?| ?Stage two                           



                          ? ? ? ? ?| ? Decreased                           



                          GFR ? ? ? ?                            



                          +---------------------                           



                          --+-------------------                           



                          --+-------------------                           



                          ------+| ?30-59 ? ? ?                           



                          ? ? ? ? ?| ?Stage                           



                          three ? ? ? ?| ? Stage                           



                          three ? ? ? ? ?                           



                          +---------------------                           



                          --+-------------------                           



                          --+-------------------                           



                          ------+| ?15-29 ? ? ?                           



                          ? ? ? ? ?| ?Stage four                           



                          ? ? ? ? | ? Stage four                           



                          ? ? ? ? ?                              



                          ?+--------------------                           



                          ---+------------------                           



                          ---+------------------                           



                          -------+| ?<15 (or                           



                          dialysis) ? ?| ?Stage                           



                          five ? ? ? ? | ? Stage                           



                          five ? ? ? ? ?                           



                          ?+--------------------                           



                          ---+------------------                           



                          ---+------------------                           



                          -------+ *Each stage                           



                          assumes the associated                           



                          GFR level has been in                           



                          effect for at least                           



                          three months. ?Stages                           



                          1 to 5, with or                           



                          without kidney                           



                          disease, indicate                           



                          chronic kidney                           



                          disease. Notes:                           



                          Determination of                           



                          stages one and two                           



                          (with eGFR                             



                          >59mL/min/1.73 m2)                           



                          requires estimation of                           



                          kidney damage for at                           



                          least three months as                           



                          defined by structural                           



                          or functional                           



                          abnormalities of the                           



                          kidney, manifested by                           



                          either:Pathological                           



                          abnormalities or                           



                          Markers of kidney                           



                          damage (including                           



                          abnormalities in the                           



                          composition of the                           



                          blood or urine or                           



                          abnormalities in                           



                          imaging tests).                           

 

             Lab Interpretation Abnormal                               



             (test code = 67744-6)                                        



[facility] 
10:42:00





             Test Item    Value        Reference Range Interpretation Comments

 

             AST(SGOT) (test code = 8988595652) 19 U/L       13-40              

       

 

             Lab Interpretation (test code = Normal                             

    



             18065-6)                                            



[facility] with Ydjtylhmtgzr4245-25-85 10:27:00





             Test Item    Value        Reference Range Interpretation Comments

 

             WBC (test code =              See_Comment                [Automated



             6690-2)                                             message] The sy

stem



                                                                 which generated



                                                                 this result



                                                                 transmitted



                                                                 reference range

:



                                                                 4.30 - 11.10



                                                                 10*3/?L. The



                                                                 reference range

 was



                                                                 not used to



                                                                 interpret this



                                                                 result as



                                                                 normal/abnormal

.

 

             RBC (test code =              See_Comment                [Automated



             789-8)                                              message] The sy

stem



                                                                 which generated



                                                                 this result



                                                                 transmitted



                                                                 reference range

:



                                                                 3.93 - 5.25



                                                                 10*6/?L. The



                                                                 reference range

 was



                                                                 not used to



                                                                 interpret this



                                                                 result as



                                                                 normal/abnormal

.

 

             HGB (test code = 10.4 g/dL    11.6-15      L            



             718-7)                                              

 

             HCT (test code = 32.2 %       35.7-45.2    L            



             4544-3)                                             

 

             MCV (test code = 80.7 fL      80.6-95.5                 



             787-2)                                              

 

             MCH (test code = 26.1 pg      25.9-32.8                 



             785-6)                                              

 

             MCHC (test code = 32.3 g/dL    31.6-35.1                 



             786-4)                                              

 

             RDW-SD (test code = 42.2 fL      39-49.9                   



             80423-6)                                            

 

             RDW-CV (test code = 14.4 %       12-15.5                   



             788-0)                                              

 

             PLT (test code =              See_Comment                [Automated



             777-3)                                              message] The sy

stem



                                                                 which generated



                                                                 this result



                                                                 transmitted



                                                                 reference range

:



                                                                 166 - 358 10*3/

?L.



                                                                 The reference r

vicki



                                                                 was not used to



                                                                 interpret this



                                                                 result as



                                                                 normal/abnormal

.

 

             MPV (test code = 12.7 fL      9.5-12.9                  



             41223-3)                                            

 

             NRBC/100 WBC (test              See_Comment                [Automat

ed



             code = 2174311041)                                        message] 

The system



                                                                 which generated



                                                                 this result



                                                                 transmitted



                                                                 reference range

:



                                                                 0.0 - 10.0 /100



                                                                 WBCs. The refer

ence



                                                                 range was not u

sed



                                                                 to interpret th

is



                                                                 result as



                                                                 normal/abnormal

.

 

             NRBC x10^3 (test code <0.01        See_Comment                [Auto

mated



             = 0471975645)                                        message] The s

ystem



                                                                 which generated



                                                                 this result



                                                                 transmitted



                                                                 reference range

:



                                                                 10*3/?L. The



                                                                 reference range

 was



                                                                 not used to



                                                                 interpret this



                                                                 result as



                                                                 normal/abnormal

.

 

             GRAN MAT (NEUT) % 65.1 %                                 



             (test code = 770-8)                                        

 

             IMM GRAN % (test code 0.30 %                                 



             = 6740561926)                                        

 

             LYMPH % (test code = 24.0 %                                 



             736-9)                                              

 

             MONO % (test code = 8.3 %                                  



             5905-5)                                             

 

             EOS % (test code = 1.8 %                                  



             713-8)                                              

 

             BASO % (test code = 0.5 %                                  



             706-2)                                              

 

             GRAN MAT x10^3(ANC) 4.79 10*3/uL 1.88-7.09                 



             (test code =                                        



             8597438873)                                         

 

             IMM GRAN x10^3 (test <0.03        0-0.06                    



             code = 5164408321)                                        

 

             LYMPH x10^3 (test code 1.77 10*3/uL 1.32-3.29                 



             = 731-0)                                            

 

             MONO x10^3 (test code 0.61 10*3/uL 0.33-0.92                 



             = 742-7)                                            

 

             EOS x10^3 (test code = 0.13 10*3/uL 0.03-0.39                 



             711-2)                                              

 

             BASO x10^3 (test code 0.04 10*3/uL 0.01-0.07                 



             = 704-7)                                            

 

             Lab Interpretation Abnormal                               



             (test code = 59338-6)                                        



[facility]ID NOW RAPID TESTING[facility]2020 
21:19:00





             Test Item    Value        Reference Range Interpretation Comments

 

             SARS-CoV-2 Rapid ID NOW Not Detected Not Detected              



             (test code = 35834-5)                                        

 

             TANO (test code = TANO) ID NOW COVID-19 Assay                        

   



                          is an isothermal                           



                          nucleic acid                           



                          amplification test                           



                          intended for the                           



                          qualitative detection                           



                          of nucleic acid from                           



                          SARS-CoV-2 viral RNA                           



                          in nasopharyngeal (NP)                           



                          specimens. It is used                           



                          under Emergency Use                           



                          Authorization (EUA) by                           



                          FDA. The limit of                           



                          detection (LOD) of the                           



                          assay is 125 Genome                           



                          Equivalents/mL. A                           



                          positive result is                           



                          indicative of the                           



                          presence of SARS-CoV-2                           



                          RNA. ?Clinical                           



                          correlation with                           



                          patient history and                           



                          other diagnostic                           



                          information is                           



                          necessary to determine                           



                          patient infection                           



                          status. A negative                           



                          (Not Detected) result                           



                          does not preclude                           



                          SARS-CoV-2 infection.                           



                          In patients with                           



                          clinical symptoms and                           



                          other tests that are                           



                          consistent with                           



                          SARS-CoV-2 infection,                           



                          negative results                           



                          should be treated as                           



                          presumptive negative                           



                          and a new specimen                           



                          should be tested with                           



                          alternative PCR                           



                          molecular test.                           



                          Invalid: Please                           



                          collect a new specimen                           



                          for repeat patient                           



                          testing if clinically                           



                          indicated.                             

 

             Lab Interpretation Normal                                 



             (test code = 03254-1)                                        



[facility]





             Test Item    Value        Reference Range Interpretation Comments

 

             APPEARANCE (test code = Hazy         Clear        A            



             [id number])                                         

 

             COLOR (test code = Yellow       Yellow                    



             [id number])                                         

 

             PH (test code =              4.8-8.0                   



             3211704102)                                         

 

             SP GRAVITY (test code =              1.003-1.030               



             9910789505)                                         

 

             GLU U QUAL (test code = Normal       Normal                    



             0634744488)                                         

 

             BLOOD (test code = Negative     Negative                  



             5933189321)                                         

 

             KETONES (test code = Negative     Negative                  



             6436984603)                                         

 

             PROTEIN (test code = Negative     Negative                  



             2887-8)                                             

 

             UROBILIN (test code = Normal       Normal                    



             1107380034)                                         

 

             BILIRUBIN (test code = Negative     Negative                  



             9201942602)                                         

 

             NITRITE (test code = Negative     Negative                  



             8571668030)                                         

 

             LEUK DERECK (test code = 75/uL        Negative     A            



             6446696665)                                         

 

             RBC/HPF (test code =              See_Comment                [Autom

ated message]



             9315619721)                                         The system [employer]



                                                                 generated this 

result



                                                                 transmitted ref

erence



                                                                 range: 0 - 3 HP

F. The



                                                                 reference range

 was



                                                                 not used to int

erpret



                                                                 this result as



                                                                 normal/abnormal

.

 

             WBC/HPF (test code =              See_Comment  H             [Autom

ated message]



             [id number])                                         The system [employer]



                                                                 generated this 

result



                                                                 transmitted ref

erence



                                                                 range: 0 - 5 HP

F. The



                                                                 reference range

 was



                                                                 not used to int

erpret



                                                                 this result as



                                                                 normal/abnormal

.

 

             BACTERIA (test code = Few          Negative     A            



             [id number])                                         

 

             MUCOUS (test code = Moderate     Negative LPF A            



             8514262224)                                         

 

             SQ EPITH (test code =              HPF                       



             6921629780)                                         

 

             Lab Interpretation (test Abnormal                               



             code = 56885-1)                                        



[facility] [id number]
13:51:00





             Test Item    Value        Reference Range Interpretation Comments

 

             POCT PH U (test code = 3254) na/          5-8                      

 

 

             POCT U LEUK EST (test code = 3263) n/a          Negative - Negative

              

 

             POCT U NIT (test code = 3262) n/a          Negative - Negative     

         

 

             POCT U PROT (test code = 3259) neg          Negative - Negative    

          

 

             POCT U GLU (test code = 3256) neg          Negative - Negative     

         

 

             POCT U KETONE (test code = 3258) n/a          Negative - Negative  

            

 

             POCT U BLD (test code = 3257) n/a          Negative - Negative     

         



[facility] SPECIFIC [id number]
15:35:00





             Test Item    Value        Reference Range Interpretation Comments

 

             POCT PH U (test code = 3254) n/a          5-8                      

 

 

             POCT U LEUK EST (test code = 3263) n/a          Negative - Negative

              

 

             POCT U NIT (test code = 3262) n/a          Negative - Negative     

         

 

             POCT U PROT (test code = 3259) neg          Negative - Negative    

          

 

             POCT U GLU (test code = 3256) neg          Negative - Negative     

         

 

             POCT U KETONE (test code = 3258) n/a          Negative - Negative  

            

 

             POCT U BLD (test code = 3257) n/a          Negative - Negative     

         



[facility] OR [facility] ONLY - WET [id number] 
01:34:00





             Test Item    Value        Reference Range Interpretation Comments

 

             Wet Prep (test code = No Clue cells present                        

   



             [id number])                                         



[facility] [id number] 14:01:00





             Test Item    Value        Reference Range Interpretation Comments

 

             LAB AP CPT CODE (BEAKER) (test code = 70533                        

          



             2749)                                               



CBC W/PLT COUNT &amp; AUTO [id number] 04:50:00





             Test Item    Value        Reference Range Interpretation Comments

 

             WHITE BLOOD CELL COUNT 8.9 K/ L     4.0-10.0                  



             (BEAKER) (test code = 775)                                        

 

             RED BLOOD CELL COUNT (BEAKER) 4.07 M/ L    4.00-5.00               

  



             (test code = 761)                                        

 

             HEMOGLOBIN (BEAKER) (test 9.2 GM/DL    12.0-15.0    L            



             code = 410)                                         

 

             HEMATOCRIT (BEAKER) (test 29.2 %       36.0-45.0    L            



             code = 411)                                         

 

             MEAN CORPUSCULAR VOLUME 71.7 fL      82.0-99.0    L            



             (BEAKER) (test code = 753)                                        

 

             MEAN CORPUSCULAR HEMOGLOBIN 22.6 pg      27.0-33.0    L            



             (BEAKER) (test code = 751)                                        

 

             MEAN CORPUSCULAR HEMOGLOBIN 31.5 GM/DL   32.0-36.0    L            



             CONC (BEAKER) (test code =                                        



             752)                                                

 

             RED CELL DISTRIBUTION WIDTH 19.0 %       10.3-14.2    H            



             (BEAKER) (test code = 412)                                        

 

             PLATELET COUNT (BEAKER) (test 304 K/CU MM  150-430                 

  No clot seen



             code = 756)                                         

 

             MEAN PLATELET VOLUME (BEAKER) 10.1 fL      6.5-10.5                

  



             (test code = 754)                                        

 

             NUCLEATED RED BLOOD CELLS 0 /100 WBC   0-0                       



             (BEAKER) (test code = 413)                                        

 

             NEUTROPHILS RELATIVE PERCENT 63 %                                  

 



             (BEAKER) (test code = 429)                                        

 

             LYMPHOCYTES RELATIVE PERCENT 26 %                                  

 



             (BEAKER) (test code = 430)                                        

 

             MONOCYTES RELATIVE PERCENT 7 %                                    



             (BEAKER) (test code = 431)                                        

 

             EOSINOPHILS RELATIVE PERCENT 3 %                                   

 



             (BEAKER) (test code = 432)                                        

 

             BASOPHILS RELATIVE PERCENT 1 %                                    



             (BEAKER) (test code = 437)                                        

 

             NEUTROPHILS ABSOLUTE COUNT 5.70 K/ L    1.80-8.00                 



             (BEAKER) (test code = 670)                                        

 

             LYMPHOCYTES ABSOLUTE COUNT 2.40 K/ L    1.48-4.50                 



             (BEAKER) (test code = 414)                                        

 

             MONOCYTES ABSOLUTE COUNT 0.60 K/ L    0.00-1.30                 



             (BEAKER) (test code = 415)                                        

 

             EOSINOPHILS ABSOLUTE COUNT 0.20 K/ L    0.00-0.50                 



             (BEAKER) (test code = 416)                                        

 

             BASOPHILS ABSOLUTE COUNT 0.00 K/ L    0.00-0.20                 



             (BEAKER) (test code = 417)                                        



(MANUAL DIFFERENTIAL)2017 04:50:00





             Test Item    Value        Reference Range Interpretation Comments

 

             TOTAL COUNTED (BEAKER) (test code                                  

      



             = 1351)                                             

 

             WBC MORPHOLOGY (BEAKER) (test Normal                               

  



             code = 487)                                         

 

             PLT MORPHOLOGY (BEAKER) (test Normal                               

  



             code = 486)                                         

 

             ANISOCYTOSIS (BEAKER) (test code 2+ moderate                       

     



             = 961)                                              

 

             HYPOCHROMIA (BEAKER) (test code = 2+ moderate                      

      



             963)                                                



[id number] 13:52:00





             Test Item    Value        Reference Range Interpretation Comments

 

             RPR SCREEN (BEAKER) (test code = Nonreactive  Nonreactive          

     



             420)                                                



(MANUAL DIFFERENTIAL)2017 13:06:00





             Test Item    Value        Reference Range Interpretation Comments

 

             TOTAL COUNTED (BEAKER) (test code =                                

        



             1351)                                               

 

             WBC MORPHOLOGY (BEAKER) (test code = Normal                        

         



             487)                                                

 

             PLT MORPHOLOGY (BEAKER) (test code = Normal                        

         



             486)                                                

 

             ANISOCYTOSIS (BEAKER) (test code = 1+ few                          

       



             961)                                                

 

             HYPOCHROMIA (BEAKER) (test code = 3+ many                          

      



             963)                                                

 

             MICROCYTES (BEAKER) (test code = 965) 1+ few                       

          



CBC W/PLT COUNT &amp; AUTO [id number] 13:06:00





             Test Item    Value        Reference Range Interpretation Comments

 

             WHITE BLOOD CELL COUNT (BEAKER) 9.4 K/ L     4.0-10.0              

    



             (test code = 775)                                        

 

             RED BLOOD CELL COUNT (BEAKER) 4.23 M/ L    4.00-5.00               

  



             (test code = 761)                                        

 

             HEMOGLOBIN (BEAKER) (test code = 9.5 GM/DL    12.0-15.0    L       

     



             410)                                                

 

             HEMATOCRIT (BEAKER) (test code = 30.3 %       36.0-45.0    L       

     



             411)                                                

 

             MEAN CORPUSCULAR VOLUME (BEAKER) 71.6 fL      82.0-99.0    L       

     



             (test code = 753)                                        

 

             MEAN CORPUSCULAR HEMOGLOBIN 22.6 pg      27.0-33.0    L            



             (BEAKER) (test code = 751)                                        

 

             MEAN CORPUSCULAR HEMOGLOBIN CONC 31.5 GM/DL   32.0-36.0    L       

     



             (BEAKER) (test code = 752)                                        

 

             RED CELL DISTRIBUTION WIDTH 18.9 %       10.3-14.2    H            



             (BEAKER) (test code = 412)                                        

 

             PLATELET COUNT (BEAKER) (test 358 K/CU MM  150-430                 

  



             code = 756)                                         

 

             MEAN PLATELET VOLUME (BEAKER) 10.1 fL      6.5-10.5                

  



             (test code = 754)                                        

 

             NUCLEATED RED BLOOD CELLS 0 /100 WBC   0-0                       



             (BEAKER) (test code = 413)                                        

 

             NEUTROPHILS RELATIVE PERCENT 73 %                                  

 



             (BEAKER) (test code = 429)                                        

 

             LYMPHOCYTES RELATIVE PERCENT 20 %                                  

 



             (BEAKER) (test code = 430)                                        

 

             MONOCYTES RELATIVE PERCENT 6 %                                    



             (BEAKER) (test code = 431)                                        

 

             EOSINOPHILS RELATIVE PERCENT 1 %                                   

 



             (BEAKER) (test code = 432)                                        

 

             BASOPHILS RELATIVE PERCENT 0 %                                    



             (BEAKER) (test code = 437)                                        

 

             NEUTROPHILS ABSOLUTE COUNT 6.80 K/ L    1.80-8.00                 



             (BEAKER) (test code = 670)                                        

 

             LYMPHOCYTES ABSOLUTE COUNT 1.90 K/ L    1.48-4.50                 



             (BEAKER) (test code = 414)                                        

 

             MONOCYTES ABSOLUTE COUNT (BEAKER) 0.50 K/ L    0.00-1.30           

      



             (test code = 415)                                        

 

             EOSINOPHILS ABSOLUTE COUNT 0.10 K/ L    0.00-0.50                 



             (BEAKER) (test code = 416)                                        

 

             BASOPHILS ABSOLUTE COUNT (BEAKER) 0.00 K/ L    0.00-0.20           

      



             (test code = 417)                                        



BLOOD GAS, CORD [id number] 11:38:00





             Test Item    Value        Reference Range Interpretation Comments

 

             PH CORD VENOUS (BEAKER) (test 7.34         7.32-7.42               

  



             code = 2866)                                        

 

             PCO2 CORD VENOUS (BEAKER) (test 46 mmHg      41-51                 

    



             code = 2867)                                        

 

             PO2 CORD VENOUS (BEAKER) (test 24 mmHg      25-40        L         

   



             code = 2868)                                        

 

             HCO3 CORD VENOUS (BEAKER) (test 24 mmol/L    21-29                 

    



             code = 2869)                                        

 

             BASE EXCESS CORD VENOUS (BEAKER) -2.0 mmol/L  -2.0-3.0             

     



             (test code = 2870)                                        

 

             PATIENT TEMPERATURE (BEAKER) 37.0 C                                

 



             (test code = 1818)                                        



BLOOD GAS, CORD [id number] 11:37:00





             Test Item    Value        Reference Range Interpretation Comments

 

             PH CORD ARTERIAL (BEAKER) (test 7.29         7.15-7.38             

    



             code = 2861)                                        

 

             PCO2 CORD ARTERIAL (BEAKER) (test 57 mmHg      32-68               

      



             code = 2862)                                        

 

             PO2 CORD ARTERIAL (BEAKER) (test 19 mmHg      16-20                

     



             code = 2863)                                        

 

             HCO3 CORD ARTERIAL (BEAKER) (test 27 mmol/L    15-27               

      



             code = 2864)                                        

 

             BASE EXCESS CORD ARTERIAL -1.3 mmol/L  -8.1-0.9                  



             (BEAKER) (test code = 2865)                                        

 

             PATIENT TEMPERATURE (BEAKER) 37.0 C                                

 



             (test code = 1818)                                        



HEPATITIS B SURFACE [id number] 08:52:00





             Test Item    Value        Reference Range Interpretation Comments

 

             HEPATITIS B SURFACE ANTIGEN (2) Nonreactive  Nonreactive           

    



             (BEAKER) (test code = 2585)

## 2023-05-18 VITALS — DIASTOLIC BLOOD PRESSURE: 86 MMHG | SYSTOLIC BLOOD PRESSURE: 120 MMHG

## 2023-05-18 VITALS — TEMPERATURE: 97.8 F

## 2023-05-18 VITALS — OXYGEN SATURATION: 97 %

## 2023-05-18 LAB
ALBUMIN SERPL BCP-MCNC: 3.8 G/DL (ref 3.4–5)
ALP SERPL-CCNC: 60 U/L (ref 45–117)
ALT SERPL W P-5'-P-CCNC: 32 U/L (ref 13–56)
AST SERPL W P-5'-P-CCNC: 11 U/L (ref 15–37)
BILIRUB INDIRECT SERPL-MCNC: (no result) MG/DL (ref 0.2–0.8)
BUN BLD-MCNC: 7 MG/DL (ref 7–18)
GLUCOSE SERPLBLD-MCNC: 127 MG/DL (ref 74–106)
HCT VFR BLD CALC: 37.5 % (ref 36–45)
INR BLD: 0.92
LYMPHOCYTES # SPEC AUTO: 3 K/UL (ref 0.7–4.9)
MAGNESIUM SERPL-MCNC: 1.9 MG/DL (ref 1.6–2.4)
MCV RBC: 88.4 FL (ref 80–100)
NT-PROBNP SERPL-MCNC: 77 PG/ML (ref ?–125)
PMV BLD: 9.3 FL (ref 7.6–11.3)
POTASSIUM SERPL-SCNC: 3 MEQ/L (ref 3.5–5.1)
RBC # BLD: 4.24 M/UL (ref 3.86–4.86)
TROPONIN I SERPL HS-MCNC: 6.4 PG/ML (ref ?–58.9)

## 2023-05-18 NOTE — RAD REPORT
EXAM DESCRIPTION:  CT - Chest For Pe Angio - 5/18/2023 5:12 am

 

 

CLINICAL HISTORY:  SOB

 

TECHNIQUE:  Axial computed tomographic angiography images of the chest with intravenous contrast.   S
agittal and coronal reformatted images were created and reviewed.   This CT exam was performed using 
one or more of the following dose reduction techniques:   automated exposure control, adjustment of t
he mA and/or kV according to patient size, and/or use of iterative reconstruction technique.

MIP reconstructed images were created and reviewed.

 

COMPARISON:  No relevant prior studies available.

 

FINDINGS:  Pulmonary arteries:   Unremarkable.   No pulmonary arterial filling defects.

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
halic and left common carotid arteries.   No thoracic aortic aneurysm.

Lungs:   Unremarkable.   No mass.   No consolidation.

Pleural space:   Unremarkable.   No significant effusion.   No pneumothorax.

Heart:   Unremarkable.   No cardiomegaly.   No significant pericardial effusion.   No evidence of RV 
dysfunction.

Bones/joints:   No acute fracture.   No dislocation.

Soft tissues:   Unremarkable.

Lymph nodes:   Unremarkable.   No enlarged lymph nodes.

 

IMPRESSION:  1.   No pulmonary embolic disease.

2.   No focal infiltrate.

 

Electronically signed by:   Bora Vargas MD   5/18/2023 2:45 AM CDT Workstation: [id number]

 

 

 

Due to temporary technical issues with the PACS/Fluency reporting system, reports are being signed by
 the in house radiologists without review as a courtesy to insure prompt reporting. The interpreting 
radiologist is fully responsible for the content of the report.

## 2023-05-18 NOTE — ER
Nurse's Notes                                                                                     

 [facility]                                                                 

Name: Nicki Walter                                                                               

Age: 28 yrs                                                                                       

Sex: Female                                                                                       

: 1995                                                                                   

MRN: [id number]                                                                                   

Arrival Date: 2023                                                                          

Time: 23:25                                                                                       

Account#: [id number]                                                                            

Bed 5                                                                                             

Private MD:                                                                                       

Diagnosis: Chest pain, unspecified;Shortness of breath                                            

                                                                                                  

Presentation:                                                                                     

                                                                                             

23:36 Chief complaint: Patient states: "I am having a really hard time catching my breath" pt as6 

      c/o sob and cp x1 day. Coronavirus screen: At this time, the client does not indicate       

      any symptoms associated with coronavirus-19. Ebola Screen: No symptoms or risks             

      identified at this time. Initial Sepsis Screen: Does the patient meet any 2 criteria?       

      No. Patient's initial sepsis screen is negative. Does the patient have a suspected          

      source of infection? No. Patient's initial sepsis screen is negative. Risk Assessment:      

      Do you want to hurt yourself or someone else? Patient reports no desire to harm self or     

      others. Onset of symptoms was May 16, 2023.                                                 

23:36 Acuity: KARSON 3                                                                           as6 

23:36 Method Of Arrival: Ambulatory                                                           as6 

                                                                                                  

Triage Assessment:                                                                                

23:35 General: Appears in no apparent distress. Behavior is cooperative, anxious. Pain:       as6 

      Complains of pain in chest. Respiratory: Reports shortness of breath.                       

                                                                                             

03:18 Respiratory: Onset: The symptoms/episode began/occurred gradually, the patient has mild aa9 

      shortness of breath.                                                                        

                                                                                                  

OB/GYN:                                                                                           

                                                                                             

23:38 LMP N/A - Birth control method                                                          as6 

                                                                                                  

Historical:                                                                                       

- Allergies:                                                                                      

23:36 No Known Allergies;                                                                     as6 

- Home Meds:                                                                                      

23:36 None [Active];                                                                          as6 

- PMHx:                                                                                           

23:36 None;                                                                                   as6 

- PSHx:                                                                                           

23:36 Appendectomy;                                                                           as6 

                                                                                                  

- Immunization history:: Client reports having NOT received the Covid vaccine.                    

- Social history:: Smoking status: Patient denies any tobacco usage or history of.                

                                                                                                  

                                                                                                  

Screenin:38 [facility] ED Fall Risk Assessment (Adult) Score/Fall Risk Level 0 - 2 = Low Risk. Abuse  as6 

      screen: Denies threats or abuse. Denies injuries from another. Nutritional screening:       

      No deficits noted. Tuberculosis screening: No symptoms or risk factors identified.          

                                                                                                  

Assessment:                                                                                       

23:51 General: Appears uncomfortable, Behavior is calm, cooperative. Neuro: Level of          aa9 

      Consciousness is awake, alert, obeys commands, Oriented to person, place, time,             

      situation. Cardiovascular: Rhythm is regular. Respiratory: Airway is patent Respiratory     

      effort is even, unlabored. : No signs and/or symptoms were reported regarding the         

      genitourinary system.                                                                       

                                                                                             

00:49 Reassessment: Patient appears in no apparent distress at this time. c/o chest pain,     aa9 

      notified provider.                                                                          

01:20 Reassessment: Patient appears in no apparent distress at this time. Patient and/or      aa9 

      family updated on plan of care and expected duration. Pain level reassessed. Patient is     

      alert, oriented x 3, equal unlabored respirations, skin warm/dry/pink. Patient states       

      feeling better.                                                                             

02:00 Reassessment: Patient appears in no apparent distress at this time. Patient and/or      aa9 

      family updated on plan of care and expected duration. Pain level reassessed. Patient is     

      alert, oriented x 3, equal unlabored respirations, skin warm/dry/pink.                      

03:00 Reassessment: Patient appears in no apparent distress at this time. Patient and/or      aa9 

      family updated on plan of care and expected duration. Pain level reassessed. Patient is     

      alert, oriented x 3, equal unlabored respirations, skin warm/dry/pink.                      

                                                                                                  

Vital Signs:                                                                                      

                                                                                             

23:36  / 106; Pulse 80; Resp 23 S; Temp 97.8(O); Pulse Ox 100% on R/A; Weight 81.65 kg  as6 

      (R); Height 5 ft. 8 in. (R); Pain 6/10;                                                     

23:52  / 83; Pulse 70; Resp 16; Pulse Ox 100% on Nebulizer Mask;                        aa9 

                                                                                             

00:09  / 78; Pulse 63; Resp 19; Pulse Ox 100% on R/A;                                   kd3 

00:30  / 75; Pulse 62; Resp 18 S; Pulse Ox 100% on R/A;                                 aa9 

02:00  / 82; Pulse 63; Resp 21 S; Pulse Ox 97% on R/A;                                  aa9 

03:10  / 86; Pulse 72; Resp 19; Pulse Ox 97% on R/A;                                    kd3 

                                                                                             

23:36 Body Mass Index 27.37 (81.65 kg, 172.72 cm)                                             as6 

05/17                                                                                             

23:36 Pain Scale: Adult                                                                       as6 

                                                                                                  

ED Course:                                                                                        

                                                                                             

23:27 Patient arrived in ED.                                                                  ag3 

23:28 Stevenson Gutierres PA is PHCP.                                                                cp  

23:28 Kelli Irizarry MD is Attending Physician.                                                cp  

23:32 Lona Malave, MASSIEL is Primary Nurse.                                                     aa9 

23:35 Arm band placed on.                                                                     as6 

23:38 Triage completed.                                                                       as6 

23:38 Placed in gown. Bed in low position. Call light in reach. Side rails up X 1. Adult w/   as6 

      patient. Client placed on continuous cardiac and pulse oximetry monitoring. NIBP            

      monitoring applied.                                                                         

                                                                                             

00:00 Inserted saline lock: 20 gauge in right antecubital area, using aseptic technique.      wm  

      Blood collected.                                                                            

00:03 XRAY Chest (1 view) In Process Unspecified.                                             EDMS

00:03 Basic Metabolic Panel Sent.                                                             wm  

00:03 CBC with Diff Sent.                                                                     wm  

00:03 D-Dimer Sent.                                                                           wm  

00:03 LFT's Sent.                                                                             wm  

00:03 Magnesium Sent.                                                                         wm  

00:03 NT PRO-BNP Sent.                                                                        wm  

00:03 PT-INR Sent.                                                                            wm  

00:03 Troponin HS Sent.                                                                       wm  

00:04 COVID-19 SARS RT PCR Sent.                                                              wm  

00:33 US Extremity Venous W Compression Arnel In Process Unspecified.                           EDMS

02:01 CT Chest For PE Angio In Process Unspecified.                                           EDMS

03:18 No provider procedures requiring assistance completed. IV discontinued, intact,         aa9 

      bleeding controlled, No redness/swelling at site. Pressure dressing applied.                

                                                                                                  

Administered Medications:                                                                         

                                                                                             

23:48 Drug: Albuterol Inhalation 2.5 mg Route: Inhalation;                                    aa9 

23:48 Drug: Ipratropium Inhalation Aerosol 0.5 mg Route: Inhalation;                          aa9 

                                                                                             

00:47 Drug: MethylPrednisoLONE  mg Route: IVP; Site: right antecubital;                aa9 

03:11 Follow up: Response: No adverse reaction                                                kd3 

00:56 Drug: Ketorolac IVP 15 mg Route: IVP; Site: right antecubital;                          aa9 

03:10 Follow up: Response: No adverse reaction; Pain is decreased                             kd3 

                                                                                                  

                                                                                                  

Medication:                                                                                       

                                                                                             

23:38 VIS not applicable for this client.                                                     as6 

                                                                                                  

Outcome:                                                                                          

                                                                                             

03:05 Discharge ordered by MD.                                                                cp  

03:18 Discharged to home ambulatory, with significant other.                                  aa9 

03:18 Condition: stable                                                                           

03:18 Condition: stable                                                                           

03:18 Discharge instructions given to patient, significant other, Instructed on discharge         

      instructions, follow up and referral plans. medication usage, Demonstrated                  

      understanding of instructions, follow-up care, medications, Prescriptions given X 2.        

03:19 Patient left the ED.                                                                    aa9 

                                                                                                  

Signatures:                                                                                       

Dispatcher [employer]                           EDMS                                                 

Stevenson Gutierres PA PA   cp                                                   

Darlene Hu                                 ag3                                                  

Eunice Bello Ashby, RN                      RN   as6                                                  

Danisha Restrepo RN                      RN   kd3                                                  

Lona Malave RN                       RN   aa9                                                  

                                                                                                  

Corrections: (The following items were deleted from the chart)                                    

03:19 03:18 Respiratory: aa9                                                                  aa9 

                                                                                                  

**************************************************************************************************

## 2023-05-18 NOTE — EKG
Test Date:    2023-05-17               Test Time:    23:41:20

Technician:   WM                                     

                                                     

MEASUREMENT RESULTS:                                       

Intervals:                                           

Rate:         69                                     

DC:           94                                     

QRSD:         92                                     

QT:           408                                    

QTc:          437                                    

Axis:                                                

P:            29                                     

DC:           94                                     

QRS:          -13                                    

T:            11                                     

                                                     

INTERPRETIVE STATEMENTS:                                       

                                                     

Sinus rhythm with sinus arrhythmia with short DC

Otherwise normal ECG

No previous ECG available for comparison



Electronically Signed On 05-18-23 11:19:59 CDT by Mehrdad Byrne

## 2023-05-18 NOTE — RAD REPORT
EXAM DESCRIPTION:  US - Extrem Venous W Compress Arnel - 5/18/2023 12:31 am

 

 

CLINICAL HISTORY:  The patient is 28 years old and is Female; PAIN

 

TECHNIQUE:  Real-time duplex ultrasound scan of the bilateral lower extremity veins integrating B-mod
e two-dimensional vascular structure, Doppler spectral analysis, color flow Doppler imaging and compr
ession.

 

COMPARISON:  No relevant prior studies available.

 

FINDINGS:  Right deep veins:   Unremarkable.   No DVT in the visualized common femoral, femoral, or p
opliteal veins.   The veins demonstrate normal color flow, are normally compressible where visualized
, with normal phasic flow and/or augmentation response.

  Left deep veins:   Unremarkable.   No DVT in the visualized common femoral, femoral, or popliteal v
eins.   The veins demonstrate normal color flow, are normally compressible where visualized, with nor
mal phasic flow and/or augmentation response.

  Soft tissues:   No acute findings.

 

IMPRESSION:  No evidence of DVT in the bilateral lower extremity veins.

 

Electronically signed by:   Philip Al MD   5/18/2023 12:40 AM CDT Workstation: [id number]

 

 

Due to temporary technical issues with the PACS/Fluency reporting system, reports are being signed by
 the in house radiologists without review as a courtesy to insure prompt reporting. The interpreting 
radiologist is fully responsible for the content of the report.

## 2023-05-18 NOTE — EDPHYS
Physician Documentation                                                                           

 [facility]                                                                 

Name: Nicki Walter                                                                               

Age: 28 yrs                                                                                       

Sex: Female                                                                                       

: 1995                                                                                   

MRN: [id number]                                                                                   

Arrival Date: 2023                                                                          

Time: 23:25                                                                                       

Account#: [id number]                                                                            

Bed 5                                                                                             

Private MD:                                                                                       

ED Physician Kelli Irizarry                                                                         

HPI:                                                                                              

                                                                                             

23:42 This 28 yrs old  Female presents to ER via Ambulatory with complaints of        cp  

      Shortness Of Breath.                                                                        

23:45 The patient has shortness of breath at rest.                                            cp  

23:45 Onset: The symptoms/episode began/occurred yesterday. Duration: The symptoms are        cp  

      continuous, and are steadily getting worse. Associated signs and symptoms: Pertinent        

      positives: chest pain, Pertinent negatives: productive cough, fever, vomiting. Severity     

      of symptoms: in the emergency department the symptoms are unchanged despite home            

      interventions.                                                                              

23:45 Patient is a 28-year-old female who presents to the emergency department with           cp  

      complaints of chest pain and shortness of breath x1 day. Patient denies cough, denies       

      fever. Reports a recent return from a long road trip to visit family in [place] and      

      reports some intermittent pain in the leg.                                                  

                                                                                                  

OB/GYN:                                                                                           

23:38 LMP N/A - Birth control method                                                          as6 

                                                                                                  

Historical:                                                                                       

- Allergies:                                                                                      

23:36 No Known Allergies;                                                                     as6 

- Home Meds:                                                                                      

23:36 None [Active];                                                                          as6 

- PMHx:                                                                                           

23:36 None;                                                                                   as6 

- PSHx:                                                                                           

23:36 Appendectomy;                                                                           as6 

                                                                                                  

- Immunization history:: Client reports having NOT received the Covid vaccine.                    

- Social history:: Smoking status: Patient denies any tobacco usage or history of.                

                                                                                                  

                                                                                                  

ROS:                                                                                              

23:50 Constitutional: Negative for body aches, chills, fever, poor PO intake.                 cp  

23:50 ENT: Negative for drainage from ear(s), ear pain, sore throat, difficulty swallowing,   cp  

      difficulty handling secretions.                                                             

23:50 Cardiovascular: Positive for chest pain, palpitations, Negative for edema.                  

23:50 Respiratory: Positive for shortness of breath, at rest. Negative for cough, wheezing.       

23:50 Abdomen/GI: Negative for abdominal pain, vomiting, diarrhea, constipation.                  

23:50 Back: Negative for pain at rest, pain with movement.                                        

23:50 : Negative for urinary symptoms.                                                          

23:50 Neuro: Negative for altered mental status, headache, numbness, syncope, near syncope,       

      weakness.                                                                                   

23:50 All other systems are negative.                                                             

                                                                                                  

Exam:                                                                                             

23:47 ECG was reviewed by the Attending Physician.                                            cp  

23:55 Constitutional: The patient appears in no acute distress, alert, awake,                 cp  

      non-diaphoretic, non-toxic, well developed, well nourished, anxious, overweight             

23:55 Head/Face:  Normocephalic, atraumatic.                                                  cp  

23:55 Eyes: Periorbital structures: appear normal, Conjunctiva: normal, no exudate, no            

      injection, Sclera: no appreciated abnormality, Lids and lashes: appear normal,              

      bilaterally.                                                                                

23:55 ENT: External ear(s): are unremarkable, Nose: is normal, Mouth: Lips: moist, Oral           

      mucosa: pink and intact, moist, Posterior pharynx: is normal, airway is patent, no          

      erythema, no exudate.                                                                       

23:55 Neck: ROM/movement: is normal, is supple, without pain, no range of motions                 

      limitations, no meningismus, no nuchal rigidity.                                            

23:55 Chest/axilla: Inspection: normal.                                                           

23:55 Cardiovascular: Rate: tachycardic, Rhythm: regular, Heart sounds: murmur, not               

      appreciated, Edema: is not appreciated, JVD: is not appreciated.                            

23:55 Respiratory: the patient does not display signs of respiratory distress,  Respirations:     

      labored breathing, that is mild, Breath sounds: are clear throughout, no decreased          

      breath sounds, no stridor, no wheezing.                                                     

23:55 Abdomen/GI: Inspection: abdomen appears normal, Palpation: abdomen is soft and              

      non-tender, in all quadrants.                                                               

23:55 Back: pain, is absent, ROM is normal.                                                       

23:55 Neuro: Orientation: to person, place \T\ time. Mentation: is normal, Cerebellar function:   

      is grossly normal, Motor: moves all fours, strength is normal, Sensation: is normal.        

                                                                                                  

Vital Signs:                                                                                      

23:36  / 106; Pulse 80; Resp 23 S; Temp 97.8(O); Pulse Ox 100% on R/A; Weight 81.65 kg  as6 

      (R); Height 5 ft. 8 in. (R); Pain 6/10;                                                     

23:52  / 83; Pulse 70; Resp 16; Pulse Ox 100% on Nebulizer Mask;                        aa9 

                                                                                             

00:09  / 78; Pulse 63; Resp 19; Pulse Ox 100% on R/A;                                   kd3 

00:30  / 75; Pulse 62; Resp 18 S; Pulse Ox 100% on R/A;                                 aa9 

02:00  / 82; Pulse 63; Resp 21 S; Pulse Ox 97% on R/A;                                  aa9 

03:10  / 86; Pulse 72; Resp 19; Pulse Ox 97% on R/A;                                    kd3 

                                                                                             

23:36 Body Mass Index 27.37 (81.65 kg, 172.72 cm)                                             as6 

                                                                                             

23:36 Pain Scale: Adult                                                                       as6 

                                                                                                  

MDM:                                                                                              

                                                                                             

23:30 Patient medically screened.                                                               

                                                                                             

00:00 Differential diagnosis: CHF exacerbation, Myocardial Infarction pneumonia, Pneumothorax cp  

      pulmonary edema, Pulmonary Embolism Sepsis.                                                 

03:05 Data reviewed: vital signs, nurses notes, lab test result(s), EKG, radiologic studies,  cp  

      CT scan, plain films.                                                                       

03:05 Antibiotic administration: Not indicated, the patient does not have an appreciated      cp  

      infiltrate. I considered the following discharge prescriptions or medication management     

      in the emergency department Medications were administered in the Emergency Department.      

      See MAR. Counseling: I had a detailed discussion with the patient and/or guardian           

      regarding: the historical points, exam findings, and any diagnostic results supporting      

      the discharge/admit diagnosis, lab results, radiology results, to return to the             

      emergency department if symptoms worsen or persist or if there are any questions or         

      concerns that arise at home. Response to treatment: the patient's symptoms have             

      markedly improved after treatment, and as a result, I will discharge patient.               

                                                                                                  

                                                                                             

23:40 Order name: Basic Metabolic Panel; Complete Time: 00:50                                   

                                                                                             

00:50 Interpretation: Normal except: K 3.0; ; GLUC 127.                                   

                                                                                             

23:40 Order name: CBC with Diff; Complete Time: 00:26                                           

                                                                                             

00:26 Interpretation: Reviewed.                                                                 

                                                                                             

23:40 Order name: D-Dimer; Complete Time: 00:26                                                 

                                                                                             

23:40 Order name: LFT's; Complete Time: 00:50                                                   

                                                                                             

00:51 Interpretation: Normal except: AST 11; GLOB 3.6.                                          

                                                                                             

23:40 Order name: Magnesium; Complete Time: 00:50                                             cp  

                                                                                             

23:40 Order name: NT PRO-BNP; Complete Time: 00:50                                            cp  

                                                                                             

23:40 Order name: PT-INR; Complete Time: 00:26                                                cp  

                                                                                             

23:40 Order name: Troponin HS; Complete Time: 00:50                                           cp  

                                                                                             

00:51 Interpretation: Reviewed.                                                               cp  

                                                                                             

23:40 Order name: COVID-19 SARS RT PCR; Complete Time: 00:50                                  cp  

                                                                                             

23:41 Order name: Urinalysis W/Microscopic; Complete Time: 01:34                              cp  

                                                                                             

01:34 Interpretation: Normal except: UCLA Turbid; UBLD 2+; UPH 7.5; ZAHRAA Cx 1+; URBC 5-10;    cp  

      UESTR 250.                                                                                  

                                                                                             

23:41 Order name: PREGU; Complete Time: 01:34                                                 cp  

                                                                                             

23:40 Order name: XRAY Chest (1 view)                                                         cp  

                                                                                             

23:41 Order name: US Extremity Venous W Compression Arnel                                       cp  

                                                                                             

00:27 Order name: CT Chest For PE Angio                                                       cp  

                                                                                             

23:40 Order name: EKG; Complete Time: 23:41                                                   cp  

                                                                                             

23:40 Order name: Cardiac monitoring; Complete Time: 23:54                                    cp  

                                                                                             

23:40 Order name: EKG - Nurse/Tech; Complete Time: 23:54                                      cp  

                                                                                             

23:40 Order name: IV Saline Lock; Complete Time: 00:03                                        cp  

                                                                                             

23:40 Order name: Labs collected and sent; Complete Time: 00:03                               cp  

                                                                                             

23:40 Order name: O2 Per Protocol; Complete Time: 23:54                                       cp  

                                                                                             

23:40 Order name: O2 Sat Monitoring; Complete Time: 23:54                                     cp  

                                                                                                  

EC/17                                                                                             

23:47 Rate is 69 beats/min. Rhythm is regular. SD interval is normal. QRS interval is normal. cp  

      QT interval is normal. T waves are Inverted in leads III, aVR. Interpreted by me.           

      Reviewed by me.                                                                             

                                                                                                  

Administered Medications:                                                                         

23:48 Drug: Albuterol Inhalation 2.5 mg Route: Inhalation;                                    aa9 

23:48 Drug: Ipratropium Inhalation Aerosol 0.5 mg Route: Inhalation;                          aa9 

                                                                                             

00:47 Drug: MethylPrednisoLONE  mg Route: IVP; Site: right antecubital;                aa9 

03:11 Follow up: Response: No adverse reaction                                                kd3 

00:56 Drug: Ketorolac IVP 15 mg Route: IVP; Site: right antecubital;                          aa9 

03:10 Follow up: Response: No adverse reaction; Pain is decreased                             kd3 

                                                                                                  

                                                                                                  

Disposition Summary:                                                                              

23 03:05                                                                                    

Discharge Ordered                                                                                 

      Location: Home                                                                          cp  

      Problem: new                                                                            cp  

      Symptoms: have improved                                                                 cp  

      Condition: Stable                                                                       cp  

      Diagnosis                                                                                   

        - Chest pain, unspecified                                                             cp  

        - Shortness of breath                                                                 cp  

      Followup:                                                                               cp  

        - With: Private Physician                                                                  

        - When: 2 - 3 days                                                                         

        - Reason: Recheck today's complaints                                                       

      Discharge Instructions:                                                                     

        - Discharge Summary Sheet                                                             cp  

        - Nonspecific Chest Pain, Adult                                                       cp  

        - Shortness of Breath, Adult                                                          cp  

      Forms:                                                                                      

        - Medication Reconciliation Form                                                      cp  

        - Thank You Letter                                                                    cp  

        - Antibiotic Education                                                                cp  

        - Prescription Opioid Use                                                             cp  

      Prescriptions:                                                                              

        - albuterol sulfate 90 mcg/actuation Inhalation HFA Aerosol Inhaler                        

            - inhale 2 inhalation by INHALATION route every 4 to 6 hours as needed for        cp  

      shortness of breath; 1 unit; Refills: 0, Product Selection Permitted                        

        - Medrol (Atul) 4 mg Oral Tablets, Dose Pack                                                

            - take 1 tablet by ORAL route as directed - follow package instructions; 1        cp  

      packet; Refills: 0, Product Selection Permitted                                             

Signatures:                                                                                       

Dispatcher [employer]                           EDStevenson Norwood PA PA   cp                                                   

Giorgi Martinez RN                      RN   as6                                                  

Lona Malave RN                       RN   aa9                                                  

Danisha Restrepo                          RN   kd3                                                  

                                                                                                  

**************************************************************************************************

## 2023-05-18 NOTE — RAD REPORT
EXAM DESCRIPTION:

RAD - Chest Single View - 5/18/2023 12:01 am

 

CLINICAL HISTORY:  The patient is 28 years old and is Female; SOB

 

TECHNIQUE:  Frontal view of the chest.

 

COMPARISON:  No relevant prior studies available.

 

FINDINGS:  Lungs:   Mildly prominent interstitial markings. No consolidation.

  Pleural space:   Unremarkable.   No pneumothorax.

  Heart:   Unremarkable.

  Mediastinum:   Unremarkable.

  Bones/joints:   Unremarkable.

 

IMPRESSION:  Mildly prominent interstitial markings. No consolidation.

 

Electronically signed by:   Philip Al MD   5/18/2023 12:15 AM CDT Workstation: [id number]

 

 

 

Due to temporary technical issues with the PACS/Fluency reporting system, reports are being signed by
 the in house radiologists without review as a courtesy to insure prompt reporting. The interpreting 
radiologist is fully responsible for the content of the report.